# Patient Record
Sex: MALE | Race: WHITE | NOT HISPANIC OR LATINO | URBAN - METROPOLITAN AREA
[De-identification: names, ages, dates, MRNs, and addresses within clinical notes are randomized per-mention and may not be internally consistent; named-entity substitution may affect disease eponyms.]

---

## 2020-08-08 ENCOUNTER — EMERGENCY (EMERGENCY)
Facility: HOSPITAL | Age: 45
LOS: 0 days | Discharge: PSYCHIATRIC OR SUBSTANCE USE FACILITY | End: 2020-08-09
Attending: STUDENT IN AN ORGANIZED HEALTH CARE EDUCATION/TRAINING PROGRAM
Payer: MEDICAID

## 2020-08-08 VITALS
HEIGHT: 68 IN | WEIGHT: 184.97 LBS | SYSTOLIC BLOOD PRESSURE: 133 MMHG | DIASTOLIC BLOOD PRESSURE: 70 MMHG | TEMPERATURE: 99 F | RESPIRATION RATE: 16 BRPM | HEART RATE: 90 BPM | OXYGEN SATURATION: 98 %

## 2020-08-08 DIAGNOSIS — F32.9 MAJOR DEPRESSIVE DISORDER, SINGLE EPISODE, UNSPECIFIED: ICD-10-CM

## 2020-08-08 DIAGNOSIS — F90.9 ATTENTION-DEFICIT HYPERACTIVITY DISORDER, UNSPECIFIED TYPE: ICD-10-CM

## 2020-08-08 DIAGNOSIS — F10.20 ALCOHOL DEPENDENCE, UNCOMPLICATED: ICD-10-CM

## 2020-08-08 DIAGNOSIS — F14.99 COCAINE USE, UNSPECIFIED WITH UNSPECIFIED COCAINE-INDUCED DISORDER: ICD-10-CM

## 2020-08-08 DIAGNOSIS — F14.10 COCAINE ABUSE, UNCOMPLICATED: ICD-10-CM

## 2020-08-08 DIAGNOSIS — R45.851 SUICIDAL IDEATIONS: ICD-10-CM

## 2020-08-08 DIAGNOSIS — F17.210 NICOTINE DEPENDENCE, CIGARETTES, UNCOMPLICATED: ICD-10-CM

## 2020-08-08 LAB
ALBUMIN SERPL ELPH-MCNC: 3.4 G/DL — SIGNIFICANT CHANGE UP (ref 3.3–5)
ALP SERPL-CCNC: 56 U/L — SIGNIFICANT CHANGE UP (ref 40–120)
ALT FLD-CCNC: 33 U/L — SIGNIFICANT CHANGE UP (ref 12–78)
ANION GAP SERPL CALC-SCNC: 7 MMOL/L — SIGNIFICANT CHANGE UP (ref 5–17)
APAP SERPL-MCNC: <2 UG/ML — LOW (ref 10–30)
AST SERPL-CCNC: 17 U/L — SIGNIFICANT CHANGE UP (ref 15–37)
BASOPHILS # BLD AUTO: 0.05 K/UL — SIGNIFICANT CHANGE UP (ref 0–0.2)
BASOPHILS NFR BLD AUTO: 0.5 % — SIGNIFICANT CHANGE UP (ref 0–2)
BILIRUB SERPL-MCNC: 0.5 MG/DL — SIGNIFICANT CHANGE UP (ref 0.2–1.2)
BUN SERPL-MCNC: 11 MG/DL — SIGNIFICANT CHANGE UP (ref 7–23)
CALCIUM SERPL-MCNC: 8.5 MG/DL — SIGNIFICANT CHANGE UP (ref 8.5–10.1)
CHLORIDE SERPL-SCNC: 109 MMOL/L — HIGH (ref 96–108)
CO2 SERPL-SCNC: 26 MMOL/L — SIGNIFICANT CHANGE UP (ref 22–31)
CREAT SERPL-MCNC: 0.89 MG/DL — SIGNIFICANT CHANGE UP (ref 0.5–1.3)
EOSINOPHIL # BLD AUTO: 0.29 K/UL — SIGNIFICANT CHANGE UP (ref 0–0.5)
EOSINOPHIL NFR BLD AUTO: 2.7 % — SIGNIFICANT CHANGE UP (ref 0–6)
ETHANOL SERPL-MCNC: <10 MG/DL — SIGNIFICANT CHANGE UP (ref 0–10)
GLUCOSE SERPL-MCNC: 110 MG/DL — HIGH (ref 70–99)
HCT VFR BLD CALC: 46.1 % — SIGNIFICANT CHANGE UP (ref 39–50)
HGB BLD-MCNC: 15.9 G/DL — SIGNIFICANT CHANGE UP (ref 13–17)
IMM GRANULOCYTES NFR BLD AUTO: 0.2 % — SIGNIFICANT CHANGE UP (ref 0–1.5)
LYMPHOCYTES # BLD AUTO: 1.42 K/UL — SIGNIFICANT CHANGE UP (ref 1–3.3)
LYMPHOCYTES # BLD AUTO: 13.4 % — SIGNIFICANT CHANGE UP (ref 13–44)
MCHC RBC-ENTMCNC: 31.9 PG — SIGNIFICANT CHANGE UP (ref 27–34)
MCHC RBC-ENTMCNC: 34.5 GM/DL — SIGNIFICANT CHANGE UP (ref 32–36)
MCV RBC AUTO: 92.4 FL — SIGNIFICANT CHANGE UP (ref 80–100)
MONOCYTES # BLD AUTO: 0.6 K/UL — SIGNIFICANT CHANGE UP (ref 0–0.9)
MONOCYTES NFR BLD AUTO: 5.7 % — SIGNIFICANT CHANGE UP (ref 2–14)
NEUTROPHILS # BLD AUTO: 8.19 K/UL — HIGH (ref 1.8–7.4)
NEUTROPHILS NFR BLD AUTO: 77.5 % — HIGH (ref 43–77)
NRBC # BLD: 0 /100 WBCS — SIGNIFICANT CHANGE UP (ref 0–0)
PLATELET # BLD AUTO: 246 K/UL — SIGNIFICANT CHANGE UP (ref 150–400)
POTASSIUM SERPL-MCNC: 4.3 MMOL/L — SIGNIFICANT CHANGE UP (ref 3.5–5.3)
POTASSIUM SERPL-SCNC: 4.3 MMOL/L — SIGNIFICANT CHANGE UP (ref 3.5–5.3)
PROT SERPL-MCNC: 6.7 GM/DL — SIGNIFICANT CHANGE UP (ref 6–8.3)
RBC # BLD: 4.99 M/UL — SIGNIFICANT CHANGE UP (ref 4.2–5.8)
RBC # FLD: 12.3 % — SIGNIFICANT CHANGE UP (ref 10.3–14.5)
SALICYLATES SERPL-MCNC: 3.2 MG/DL — SIGNIFICANT CHANGE UP (ref 2.8–20)
SARS-COV-2 RNA SPEC QL NAA+PROBE: SIGNIFICANT CHANGE UP
SODIUM SERPL-SCNC: 142 MMOL/L — SIGNIFICANT CHANGE UP (ref 135–145)
WBC # BLD: 10.57 K/UL — HIGH (ref 3.8–10.5)
WBC # FLD AUTO: 10.57 K/UL — HIGH (ref 3.8–10.5)

## 2020-08-08 PROCEDURE — 93010 ELECTROCARDIOGRAM REPORT: CPT

## 2020-08-08 PROCEDURE — 90792 PSYCH DIAG EVAL W/MED SRVCS: CPT | Mod: 95

## 2020-08-08 PROCEDURE — 99285 EMERGENCY DEPT VISIT HI MDM: CPT

## 2020-08-08 RX ORDER — THIAMINE MONONITRATE (VIT B1) 100 MG
100 TABLET ORAL DAILY
Refills: 0 | Status: COMPLETED | OUTPATIENT
Start: 2020-08-09 | End: 2020-08-11

## 2020-08-08 RX ORDER — HALOPERIDOL DECANOATE 100 MG/ML
5 INJECTION INTRAMUSCULAR EVERY 6 HOURS
Refills: 0 | Status: DISCONTINUED | OUTPATIENT
Start: 2020-08-09 | End: 2020-08-24

## 2020-08-08 RX ORDER — FOLIC ACID 0.8 MG
1 TABLET ORAL DAILY
Refills: 0 | Status: DISCONTINUED | OUTPATIENT
Start: 2020-08-09 | End: 2020-08-26
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
## 2020-08-08 NOTE — ED BEHAVIORAL HEALTH ASSESSMENT NOTE - ACTIVATING EVENTS/STRESSORS
Triggering events leading to humiliation, shame, and/or despair (e.g. Loss of relationship, financial or health status) (real or anticipated)/Pending incarceration or homelessness/Current or pending social isolation/Perceived burden on family or others
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
## 2020-08-08 NOTE — ED BEHAVIORAL HEALTH ASSESSMENT NOTE - RISK ASSESSMENT
High Acute Suicide Risk Acute risk is elevated because of SI with plan. Other risks include substance abuse and homelessness. These are risks that are modifiable by inpatient treatment.
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
## 2020-08-08 NOTE — ED ADULT NURSE NOTE - NSIMPLEMENTINTERV_GEN_ALL_ED
Implemented All Universal Safety Interventions:  [place] to call system. Call bell, personal items and telephone within reach. Instruct patient to call for assistance. Room bathroom lighting operational. Non-slip footwear when patient is off stretcher. Physically safe environment: no spills, clutter or unnecessary equipment. Stretcher in lowest position, wheels locked, appropriate side rails in place.

## 2020-08-08 NOTE — ED ADULT NURSE NOTE - OBJECTIVE STATEMENT
45 year old male presents to ED stating he has suicidal thoughts with plan to overdose on drugs x appx one week.  Endorses occasional homicidal ideation of others. States he uses crack cocaine and drinks alcohol daily. Last use at 0800 today. Has hx of bipolar disorder, non-compliant on medication. Arrives a&ox3, in no acute distress. Pt currently calm, cooperative, speech and thoughts clear, coherent. 1:1 initiated.
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
## 2020-08-08 NOTE — ED ADULT TRIAGE NOTE - CHIEF COMPLAINT QUOTE
pt states he is depressed and feels suicidal . pt states he has been taking drugs for the last couple of days (crack and cocaine )

## 2020-08-08 NOTE — ED BEHAVIORAL HEALTH ASSESSMENT NOTE - SUMMARY
45 year-old man with depression and cocaine and alcohol use disorders, history of ADHD, denying psychiatric hospitalizations, no known suicide attempts, undomiciled, unemployed, single, who walked in to the ED today with a chief complaint of being suicidal. The patient reports persistently low mood and anhedonia in the context of psychosocial stressors such as homelessness and unemployment and social isolation. He reports SI with a plan to overdose on drugs. He presents as dysphoric and hopeless. The patient is appropriate for inpatient treatment.

## 2020-08-09 ENCOUNTER — INPATIENT (INPATIENT)
Facility: HOSPITAL | Age: 45
LOS: 16 days | Discharge: PSYCHIATRIC OR SUBSTANCE USE FACILITY | DRG: 753 | End: 2020-08-26
Attending: PSYCHIATRY & NEUROLOGY | Admitting: PSYCHIATRY & NEUROLOGY
Payer: MEDICAID

## 2020-08-09 VITALS
HEART RATE: 71 BPM | TEMPERATURE: 98 F | RESPIRATION RATE: 18 BRPM | SYSTOLIC BLOOD PRESSURE: 128 MMHG | OXYGEN SATURATION: 98 % | DIASTOLIC BLOOD PRESSURE: 84 MMHG

## 2020-08-09 VITALS — WEIGHT: 184.97 LBS | HEIGHT: 68 IN | RESPIRATION RATE: 16 BRPM | TEMPERATURE: 98 F

## 2020-08-09 DIAGNOSIS — F31.32 BIPOLAR DISORDER, CURRENT EPISODE DEPRESSED, MODERATE: ICD-10-CM

## 2020-08-09 DIAGNOSIS — F32.9 MAJOR DEPRESSIVE DISORDER, SINGLE EPISODE, UNSPECIFIED: ICD-10-CM

## 2020-08-09 DIAGNOSIS — F10.10 ALCOHOL ABUSE, UNCOMPLICATED: ICD-10-CM

## 2020-08-09 DIAGNOSIS — F14.10 COCAINE ABUSE, UNCOMPLICATED: ICD-10-CM

## 2020-08-09 LAB
ALBUMIN SERPL ELPH-MCNC: 3.2 G/DL — LOW (ref 3.3–5)
ALP SERPL-CCNC: 60 U/L — SIGNIFICANT CHANGE UP (ref 40–120)
ALT FLD-CCNC: 30 U/L — SIGNIFICANT CHANGE UP (ref 12–78)
AMPHET UR-MCNC: NEGATIVE — SIGNIFICANT CHANGE UP
ANION GAP SERPL CALC-SCNC: 4 MMOL/L — LOW (ref 5–17)
AST SERPL-CCNC: 17 U/L — SIGNIFICANT CHANGE UP (ref 15–37)
BARBITURATES UR SCN-MCNC: NEGATIVE — SIGNIFICANT CHANGE UP
BENZODIAZ UR-MCNC: NEGATIVE — SIGNIFICANT CHANGE UP
BILIRUB DIRECT SERPL-MCNC: <0.1 MG/DL — SIGNIFICANT CHANGE UP (ref 0–0.2)
BILIRUB INDIRECT FLD-MCNC: >0.1 MG/DL — LOW (ref 0.2–1)
BILIRUB SERPL-MCNC: 0.2 MG/DL — SIGNIFICANT CHANGE UP (ref 0.2–1.2)
BUN SERPL-MCNC: 12 MG/DL — SIGNIFICANT CHANGE UP (ref 7–23)
CALCIUM SERPL-MCNC: 8.3 MG/DL — LOW (ref 8.5–10.1)
CHLORIDE SERPL-SCNC: 109 MMOL/L — HIGH (ref 96–108)
CO2 SERPL-SCNC: 27 MMOL/L — SIGNIFICANT CHANGE UP (ref 22–31)
COCAINE METAB.OTHER UR-MCNC: POSITIVE — SIGNIFICANT CHANGE UP
CREAT SERPL-MCNC: 0.98 MG/DL — SIGNIFICANT CHANGE UP (ref 0.5–1.3)
GLUCOSE SERPL-MCNC: 96 MG/DL — SIGNIFICANT CHANGE UP (ref 70–99)
MAGNESIUM SERPL-MCNC: 2.1 MG/DL — SIGNIFICANT CHANGE UP (ref 1.6–2.6)
METHADONE UR-MCNC: NEGATIVE — SIGNIFICANT CHANGE UP
OPIATES UR-MCNC: NEGATIVE — SIGNIFICANT CHANGE UP
PCP SPEC-MCNC: SIGNIFICANT CHANGE UP
PCP UR-MCNC: NEGATIVE — SIGNIFICANT CHANGE UP
PHOSPHATE SERPL-MCNC: 3 MG/DL — SIGNIFICANT CHANGE UP (ref 2.5–4.5)
POTASSIUM SERPL-MCNC: 4 MMOL/L — SIGNIFICANT CHANGE UP (ref 3.5–5.3)
POTASSIUM SERPL-SCNC: 4 MMOL/L — SIGNIFICANT CHANGE UP (ref 3.5–5.3)
PROT SERPL-MCNC: 6.7 GM/DL — SIGNIFICANT CHANGE UP (ref 6–8.3)
SODIUM SERPL-SCNC: 140 MMOL/L — SIGNIFICANT CHANGE UP (ref 135–145)
THC UR QL: NEGATIVE — SIGNIFICANT CHANGE UP

## 2020-08-09 PROCEDURE — 99254 IP/OBS CNSLTJ NEW/EST MOD 60: CPT

## 2020-08-09 PROCEDURE — 93005 ELECTROCARDIOGRAM TRACING: CPT

## 2020-08-09 PROCEDURE — 80048 BASIC METABOLIC PNL TOTAL CA: CPT

## 2020-08-09 PROCEDURE — 36415 COLL VENOUS BLD VENIPUNCTURE: CPT

## 2020-08-09 PROCEDURE — 84443 ASSAY THYROID STIM HORMONE: CPT

## 2020-08-09 PROCEDURE — 80061 LIPID PANEL: CPT

## 2020-08-09 PROCEDURE — 80076 HEPATIC FUNCTION PANEL: CPT

## 2020-08-09 PROCEDURE — 80164 ASSAY DIPROPYLACETIC ACD TOT: CPT

## 2020-08-09 PROCEDURE — 83036 HEMOGLOBIN GLYCOSYLATED A1C: CPT

## 2020-08-09 PROCEDURE — 99222 1ST HOSP IP/OBS MODERATE 55: CPT

## 2020-08-09 PROCEDURE — 84100 ASSAY OF PHOSPHORUS: CPT

## 2020-08-09 PROCEDURE — 86769 SARS-COV-2 COVID-19 ANTIBODY: CPT

## 2020-08-09 PROCEDURE — 85027 COMPLETE CBC AUTOMATED: CPT

## 2020-08-09 PROCEDURE — 81003 URINALYSIS AUTO W/O SCOPE: CPT

## 2020-08-09 PROCEDURE — 93010 ELECTROCARDIOGRAM REPORT: CPT

## 2020-08-09 PROCEDURE — 83735 ASSAY OF MAGNESIUM: CPT

## 2020-08-09 RX ORDER — LAMOTRIGINE 25 MG/1
25 TABLET, ORALLY DISINTEGRATING ORAL ONCE
Refills: 0 | Status: COMPLETED | OUTPATIENT
Start: 2020-08-09 | End: 2020-08-09

## 2020-08-09 RX ORDER — LAMOTRIGINE 25 MG/1
25 TABLET, ORALLY DISINTEGRATING ORAL DAILY
Refills: 0 | Status: DISCONTINUED | OUTPATIENT
Start: 2020-08-10 | End: 2020-08-10

## 2020-08-09 RX ORDER — NICOTINE POLACRILEX 2 MG
1 GUM BUCCAL DAILY
Refills: 0 | Status: DISCONTINUED | OUTPATIENT
Start: 2020-08-09 | End: 2020-08-26

## 2020-08-09 RX ORDER — BUPROPION HYDROCHLORIDE 150 MG/1
75 TABLET, EXTENDED RELEASE ORAL DAILY
Refills: 0 | Status: DISCONTINUED | OUTPATIENT
Start: 2020-08-09 | End: 2020-08-14

## 2020-08-09 RX ORDER — NICOTINE POLACRILEX 2 MG
2 GUM BUCCAL
Refills: 0 | Status: DISCONTINUED | OUTPATIENT
Start: 2020-08-09 | End: 2020-08-26

## 2020-08-09 RX ADMIN — Medication 100 MILLIGRAM(S): at 09:28

## 2020-08-09 RX ADMIN — LAMOTRIGINE 25 MILLIGRAM(S): 25 TABLET, ORALLY DISINTEGRATING ORAL at 13:14

## 2020-08-09 RX ADMIN — Medication 1 MILLIGRAM(S): at 09:28

## 2020-08-09 RX ADMIN — BUPROPION HYDROCHLORIDE 75 MILLIGRAM(S): 150 TABLET, EXTENDED RELEASE ORAL at 13:14

## 2020-08-09 RX ADMIN — Medication 1 TABLET(S): at 09:28

## 2020-08-09 NOTE — BEHAVIORAL HEALTH ASSESSMENT NOTE - RISK ASSESSMENT
Moderate Acute Suicide Risk Acute risk is elevated because of SI with plan. Other risks include substance abuse and homelessness. These are risks that are modifiable by inpatient treatment.

## 2020-08-09 NOTE — BEHAVIORAL HEALTH ASSESSMENT NOTE - SUMMARY
45 year-old man with depression and cocaine and alcohol use disorders, history of ADHD, denying psychiatric hospitalizations, no known suicide attempts, un-domiciled, unemployed, single, who walked in to the ED today with a chief complaint of being suicidal. The patient reports persistently low mood and anhedonia in the context of psychosocial stressors such as homelessness and unemployment and social isolation. He reports SI with a plan to overdose on drugs. He presents as dysphoric and hopeless. The patient is appropriate for inpatient treatment.
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
## 2020-08-09 NOTE — BEHAVIORAL HEALTH ASSESSMENT NOTE - NSBHCHARTREVIEWLAB_PSY_A_CORE FT
15.9   10.57 )-----------( 246      ( 08 Aug 2020 16:05 )             46.1   08-09    140  |  109<H>  |  12  ----------------------------<  96  4.0   |  27  |  0.98    Ca    8.3<L>      09 Aug 2020 08:08  Phos  3.0     08-09  Mg     2.1     08-09    TPro  6.7  /  Alb  3.2<L>  /  TBili  0.2  /  DBili  <0.1  /  AST  17  /  ALT  30  /  AlkPhos  60  08-09

## 2020-08-09 NOTE — H&P ADULT - NSHPPHYSICALEXAM_GEN_ALL_CORE
Vital Signs Last 24 Hrs  T(C): 36.6 (09 Aug 2020 07:52), Max: 36.9 (09 Aug 2020 02:07)  T(F): 97.9 (09 Aug 2020 07:52), Max: 98.4 (09 Aug 2020 02:07)  HR: 71 (09 Aug 2020 02:07) (71 - 77)  BP: 128/84 (09 Aug 2020 02:07) (128/84 - 129/81)  RR: 16 (09 Aug 2020 07:52) (16 - 18)  SpO2: 98% (09 Aug 2020 07:52) (98% - 98%)

## 2020-08-09 NOTE — BEHAVIORAL HEALTH ASSESSMENT NOTE - ACTIVATING EVENTS/STRESSORS
Current or pending social isolation/Perceived burden on family or others/Pending incarceration or homelessness/Triggering events leading to humiliation, shame, and/or despair (e.g. Loss of relationship, financial or health status) (real or anticipated)

## 2020-08-09 NOTE — H&P ADULT - ASSESSMENT
46 yo M with PMH of ADHD, Bipolar admitted to [facility] for eval. of worsening of depression, suicidal ideation and auditory hallucination.    # Depression/Suicidal ideation/ ADHD/ Bipolar disorder/ Psych problems  - Manage as per primary psych team    # Mild Leucocytosis  - likely reactive  - WBC: 10.5, afebrile  - Denies fever, chills, cough, running nose, dysuria diarrhea  - UA ordered   - Repeat CBC  - Encourage oral hydration      # Hx of Cocaine use  - Denies chest pain, SOB, palpitation or any acute c/o at present  - EKG: NSR 74, Qt/Qtc: 398/441 ms  - Counselled on cessation, education provided    # DVT Ppx  - Encourage Ambulation    IMPROVE VTE Individual Risk Assessment    RISK                                                                Points    [  ] Previous VTE                                                  3    [  ] Thrombophilia                                               2    [  ] Lower limb paralysis                                      2        (unable to hold up >15 seconds)      [  ] Current Cancer                                              2         (within 6 months)    [  ] Immobilization > 24 hrs                                1    [  ] ICU/CCU stay > 24 hours                              1    [  ] Age > 60                                                      1    IMPROVE VTE Score ______0___    IMPROVE Score 0-1: Low Risk, No VTE prophylaxis required for most patients, encourage ambulation.   IMPROVE Score 2-3: At risk, pharmacologic VTE prophylaxis is indicated for most patients (in the absence of a contraindication)  IMPROVE Score > or = 4: High Risk, pharmacologic VTE prophylaxis is indicated for most patients (in the absence of a contraindication)      Case d/w  46 yo M with PMH of ADHD, Bipolar admitted to [facility] for eval. of worsening of depression, suicidal ideation and auditory hallucination.    # Depression/Suicidal ideation/ ADHD/ Bipolar disorder/ Psych problems  - Manage as per primary psych team    # Mild Leucocytosis  - likely reactive  - WBC: 10.5, afebrile  - Denies fever, chills, cough, runny nose, dysuria diarrhea  - UA ordered   - Repeat CBC  - Encourage oral hydration      # Hx of Cocaine use  - Denies chest pain, SOB, palpitation or any acute c/o at present  - EKG: NSR 74, Qt/Qtc: 398/441 ms  - Counselled on cessation, education provided    # Nicotine dependence  - Nicotine patch   - Counselled on cessation, education provided    # Hx of Alcohol abuse  - CIWA protocol ordered by primary team   - c/w MVI/THiamine/FA  - Monitor for withdrawl    # DVT Ppx  - Encourage Ambulation    IMPROVE VTE Individual Risk Assessment    RISK                                                                Points    [  ] Previous VTE                                                  3    [  ] Thrombophilia                                               2    [  ] Lower limb paralysis                                      2        (unable to hold up >15 seconds)      [  ] Current Cancer                                              2         (within 6 months)    [  ] Immobilization > 24 hrs                                1    [  ] ICU/CCU stay > 24 hours                              1    [  ] Age > 60                                                      1    IMPROVE VTE Score ______0___    IMPROVE Score 0-1: Low Risk, No VTE prophylaxis required for most patients, encourage ambulation.   IMPROVE Score 2-3: At risk, pharmacologic VTE prophylaxis is indicated for most patients (in the absence of a contraindication)  IMPROVE Score > or = 4: High Risk, pharmacologic VTE prophylaxis is indicated for most patients (in the absence of a contraindication)      Case d/w

## 2020-08-09 NOTE — BEHAVIORAL HEALTH ASSESSMENT NOTE - NSBHCHARTREVIEWVS_PSY_A_CORE FT
Vital Signs Last 24 Hrs  T(C): 36.6 (09 Aug 2020 07:52), Max: 37.1 (08 Aug 2020 14:47)  T(F): 97.9 (09 Aug 2020 07:52), Max: 98.7 (08 Aug 2020 14:47)  HR: 71 (09 Aug 2020 02:07) (71 - 90)  BP: 128/84 (09 Aug 2020 02:07) (128/84 - 133/70)  BP(mean): --  RR: 16 (09 Aug 2020 07:52) (16 - 18)  SpO2: 98% (09 Aug 2020 07:52) (97% - 98%)

## 2020-08-09 NOTE — H&P ADULT - HISTORY OF PRESENT ILLNESS
46 yo M with PMH of ADHD, Bipolar admitted to  for eval. of worsening of depression, suicidal ideation and auditory hallucination. Pt is resting in bed. Denies headache, dizziness, chest pain, palpitation or SOB. Denies fever, chills, cough, dysuria or diarrhea. Medicine consult is called for medical Mx. Pt was seen and examined in presence of   Nursing Assistant.     PMH: As above  PSH: Denies any surgery in the past  Social Hx: Pt reported that he smokes 2PPD, Smokes cocaine daily, Drinks alcohol 2- 3 pint of vodka  Family Hx: Denies any family Hx of CAD, DM, HTN or HLD
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
## 2020-08-10 LAB
ANION GAP SERPL CALC-SCNC: 7 MMOL/L — SIGNIFICANT CHANGE UP (ref 5–17)
BUN SERPL-MCNC: 12 MG/DL — SIGNIFICANT CHANGE UP (ref 7–23)
CALCIUM SERPL-MCNC: 8.6 MG/DL — SIGNIFICANT CHANGE UP (ref 8.5–10.1)
CHLORIDE SERPL-SCNC: 109 MMOL/L — HIGH (ref 96–108)
CO2 SERPL-SCNC: 24 MMOL/L — SIGNIFICANT CHANGE UP (ref 22–31)
CREAT SERPL-MCNC: 0.88 MG/DL — SIGNIFICANT CHANGE UP (ref 0.5–1.3)
GLUCOSE SERPL-MCNC: 95 MG/DL — SIGNIFICANT CHANGE UP (ref 70–99)
HCT VFR BLD CALC: 45.5 % — SIGNIFICANT CHANGE UP (ref 39–50)
HGB BLD-MCNC: 15.3 G/DL — SIGNIFICANT CHANGE UP (ref 13–17)
MAGNESIUM SERPL-MCNC: 2.2 MG/DL — SIGNIFICANT CHANGE UP (ref 1.6–2.6)
MCHC RBC-ENTMCNC: 32.2 PG — SIGNIFICANT CHANGE UP (ref 27–34)
MCHC RBC-ENTMCNC: 33.6 GM/DL — SIGNIFICANT CHANGE UP (ref 32–36)
MCV RBC AUTO: 95.8 FL — SIGNIFICANT CHANGE UP (ref 80–100)
PHOSPHATE SERPL-MCNC: 3.3 MG/DL — SIGNIFICANT CHANGE UP (ref 2.5–4.5)
PLATELET # BLD AUTO: 212 K/UL — SIGNIFICANT CHANGE UP (ref 150–400)
POTASSIUM SERPL-MCNC: 4.1 MMOL/L — SIGNIFICANT CHANGE UP (ref 3.5–5.3)
POTASSIUM SERPL-SCNC: 4.1 MMOL/L — SIGNIFICANT CHANGE UP (ref 3.5–5.3)
RBC # BLD: 4.75 M/UL — SIGNIFICANT CHANGE UP (ref 4.2–5.8)
RBC # FLD: 12.1 % — SIGNIFICANT CHANGE UP (ref 10.3–14.5)
SODIUM SERPL-SCNC: 140 MMOL/L — SIGNIFICANT CHANGE UP (ref 135–145)
WBC # BLD: 7.49 K/UL — SIGNIFICANT CHANGE UP (ref 3.8–10.5)
WBC # FLD AUTO: 7.49 K/UL — SIGNIFICANT CHANGE UP (ref 3.8–10.5)

## 2020-08-10 PROCEDURE — 99232 SBSQ HOSP IP/OBS MODERATE 35: CPT

## 2020-08-10 RX ORDER — CYCLOBENZAPRINE HYDROCHLORIDE 10 MG/1
5 TABLET, FILM COATED ORAL THREE TIMES A DAY
Refills: 0 | Status: DISCONTINUED | OUTPATIENT
Start: 2020-08-10 | End: 2020-08-11

## 2020-08-10 RX ORDER — IBUPROFEN 200 MG
400 TABLET ORAL EVERY 6 HOURS
Refills: 0 | Status: DISCONTINUED | OUTPATIENT
Start: 2020-08-10 | End: 2020-08-17

## 2020-08-10 RX ORDER — DIVALPROEX SODIUM 500 MG/1
250 TABLET, DELAYED RELEASE ORAL DAILY
Refills: 0 | Status: DISCONTINUED | OUTPATIENT
Start: 2020-08-11 | End: 2020-08-26

## 2020-08-10 RX ORDER — IBUPROFEN 200 MG
400 TABLET ORAL EVERY 6 HOURS
Refills: 0 | Status: DISCONTINUED | OUTPATIENT
Start: 2020-08-10 | End: 2020-08-10

## 2020-08-10 RX ORDER — DIVALPROEX SODIUM 500 MG/1
500 TABLET, DELAYED RELEASE ORAL AT BEDTIME
Refills: 0 | Status: DISCONTINUED | OUTPATIENT
Start: 2020-08-10 | End: 2020-08-14

## 2020-08-10 RX ADMIN — Medication 1 TABLET(S): at 09:07

## 2020-08-10 RX ADMIN — BUPROPION HYDROCHLORIDE 75 MILLIGRAM(S): 150 TABLET, EXTENDED RELEASE ORAL at 09:07

## 2020-08-10 RX ADMIN — Medication 1 PATCH: at 09:07

## 2020-08-10 RX ADMIN — LAMOTRIGINE 25 MILLIGRAM(S): 25 TABLET, ORALLY DISINTEGRATING ORAL at 09:07

## 2020-08-10 RX ADMIN — Medication 1 MILLIGRAM(S): at 09:07

## 2020-08-10 RX ADMIN — Medication 100 MILLIGRAM(S): at 09:07

## 2020-08-10 NOTE — PROGRESS NOTE BEHAVIORAL HEALTH - NSBHCHARTREVIEWVS_PSY_A_CORE FT
Vital Signs Last 24 Hrs  T(C): 36.4 (10 Aug 2020 07:46), Max: 36.4 (10 Aug 2020 07:46)  T(F): 97.6 (10 Aug 2020 07:46), Max: 97.6 (10 Aug 2020 07:46)  HR: 80 (10 Aug 2020 06:25) (80 - 80)  BP: 130/92 (10 Aug 2020 06:25) (130/92 - 130/92)  BP(mean): 102 (10 Aug 2020 06:25) (102 - 102)  RR: 18 (10 Aug 2020 07:46) (18 - 18)  SpO2: 100% (10 Aug 2020 07:46) (100% - 100%)

## 2020-08-10 NOTE — PROGRESS NOTE BEHAVIORAL HEALTH - NSBHFUPIPCHARTREVFT_PSY_A_CORE
45 yr old Single  Unemployed homeless domiciled in a van, hx of Bipolar affective disorder, currently depressed, cocaine and alcohol use disorders, history of ADHD, self referred to ED at [facility], claims to be very depressed for the past several weeks and using cocaine and alcohol to self medicate. He emphasized feeling so hopelessness, anhedonia that he  considered suicide by overdosing on drugs.

## 2020-08-10 NOTE — PROGRESS NOTE BEHAVIORAL HEALTH - PROBLEM SELECTOR PLAN 2
encourage to attend groups for insight and coping skill m encourage to attend groups for insight and coping skill

## 2020-08-10 NOTE — PROGRESS NOTE BEHAVIORAL HEALTH - RISK ASSESSMENT
moderate risk  Acute: suicidal ideation , impulsive,  protective: supportive family, pt with help seeking behavior   Chronic: homeless. cocaine. alcohol moderate risk  Acute: suicidal ideation , impulsive,  mitigating factors: Pt denies any suicidal intent or plan , pt hoping to attend rehab treatment   protective: supportive family, pt with help seeking behavior   Chronic: homeless. cocaine. alcohol
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
## 2020-08-10 NOTE — PROGRESS NOTE BEHAVIORAL HEALTH - NSBHADDHXSUBSTFT_PSY_A_CORE
using 6 grams of cocaine per day along with 6-8 tall boys and 2-3 pints of vodka per day for about the past 4-6 months.  rehab mostly [facility] in 2010 and was clean for years, but relapsed

## 2020-08-10 NOTE — PROGRESS NOTE BEHAVIORAL HEALTH - SUMMARY
45 year-old man with depression and cocaine and alcohol use disorders, history of ADHD, denying psychiatric hospitalizations, no known suicide attempts, undomiciled, unemployed, single, who walked in to the ED today with a chief complaint of being suicidal. The patient reports persistently low mood and anhedonia in the context of psychosocial stressors such as homelessness and unemployment and social isolation. He reports SI with a plan to overdose on drugs. He presents as dysphoric and hopeless. The patient is appropriate for inpatient treatment. 45 year-old man with depression and cocaine and alcohol use disorders, history of ADHD, denying psychiatric hospitalizations, no known suicide attempts, undomiciled, unemployed, single, who walked in to the ED today with a chief complaint of being suicidal. The patient reports persistently low mood and anhedonia in the context of psychosocial stressors such as homelessness and unemployment and social isolation. He reports SI with a plan to overdose on drugs. He presents as dysphoric and hopeless. The patient is appropriate for inpatient treatment.    Hospitalization  course:   8/10/2020 Pt  with willingness to try Depakote for mood stability , still depressed in mood and wanting to remain on the antidepressant medication .

## 2020-08-11 LAB
A1C WITH ESTIMATED AVERAGE GLUCOSE RESULT: 5.1 % — SIGNIFICANT CHANGE UP (ref 4–5.6)
APPEARANCE UR: CLEAR — SIGNIFICANT CHANGE UP
BILIRUB UR-MCNC: NEGATIVE — SIGNIFICANT CHANGE UP
CHOLEST SERPL-MCNC: 184 MG/DL — SIGNIFICANT CHANGE UP (ref 10–199)
COLOR SPEC: YELLOW — SIGNIFICANT CHANGE UP
DIFF PNL FLD: NEGATIVE — SIGNIFICANT CHANGE UP
ESTIMATED AVERAGE GLUCOSE: 100 MG/DL — SIGNIFICANT CHANGE UP (ref 68–114)
GLUCOSE UR QL: NEGATIVE MG/DL — SIGNIFICANT CHANGE UP
HDLC SERPL-MCNC: 37 MG/DL — LOW
KETONES UR-MCNC: NEGATIVE — SIGNIFICANT CHANGE UP
LEUKOCYTE ESTERASE UR-ACNC: NEGATIVE — SIGNIFICANT CHANGE UP
LIPID PNL WITH DIRECT LDL SERPL: 110 MG/DL — HIGH
NITRITE UR-MCNC: NEGATIVE — SIGNIFICANT CHANGE UP
PH UR: 6 — SIGNIFICANT CHANGE UP (ref 5–8)
PROT UR-MCNC: NEGATIVE MG/DL — SIGNIFICANT CHANGE UP
SP GR SPEC: 1.02 — SIGNIFICANT CHANGE UP (ref 1.01–1.02)
TOTAL CHOLESTEROL/HDL RATIO MEASUREMENT: 5 RATIO — SIGNIFICANT CHANGE UP (ref 3.4–9.6)
TRIGL SERPL-MCNC: 184 MG/DL — HIGH (ref 10–149)
TSH SERPL-MCNC: 0.99 UU/ML — SIGNIFICANT CHANGE UP (ref 0.34–4.82)
UROBILINOGEN FLD QL: NEGATIVE MG/DL — SIGNIFICANT CHANGE UP

## 2020-08-11 PROCEDURE — 99232 SBSQ HOSP IP/OBS MODERATE 35: CPT

## 2020-08-11 RX ORDER — CYCLOBENZAPRINE HYDROCHLORIDE 10 MG/1
5 TABLET, FILM COATED ORAL AT BEDTIME
Refills: 0 | Status: DISCONTINUED | OUTPATIENT
Start: 2020-08-11 | End: 2020-08-26

## 2020-08-11 RX ADMIN — Medication 400 MILLIGRAM(S): at 10:09

## 2020-08-11 RX ADMIN — Medication 1 PATCH: at 10:07

## 2020-08-11 RX ADMIN — Medication 1 PATCH: at 10:11

## 2020-08-11 RX ADMIN — Medication 400 MILLIGRAM(S): at 19:07

## 2020-08-11 RX ADMIN — Medication 400 MILLIGRAM(S): at 11:21

## 2020-08-11 RX ADMIN — Medication 400 MILLIGRAM(S): at 20:12

## 2020-08-11 RX ADMIN — Medication 1 MILLIGRAM(S): at 10:07

## 2020-08-11 RX ADMIN — BUPROPION HYDROCHLORIDE 75 MILLIGRAM(S): 150 TABLET, EXTENDED RELEASE ORAL at 10:08

## 2020-08-11 RX ADMIN — Medication 1 PATCH: at 19:13

## 2020-08-11 RX ADMIN — DIVALPROEX SODIUM 250 MILLIGRAM(S): 500 TABLET, DELAYED RELEASE ORAL at 10:07

## 2020-08-11 RX ADMIN — Medication 100 MILLIGRAM(S): at 10:07

## 2020-08-11 RX ADMIN — Medication 1 TABLET(S): at 10:07

## 2020-08-11 RX ADMIN — DIVALPROEX SODIUM 500 MILLIGRAM(S): 500 TABLET, DELAYED RELEASE ORAL at 21:15

## 2020-08-11 RX ADMIN — CYCLOBENZAPRINE HYDROCHLORIDE 5 MILLIGRAM(S): 10 TABLET, FILM COATED ORAL at 22:02

## 2020-08-11 NOTE — PROGRESS NOTE BEHAVIORAL HEALTH - NSBHFUPINTERVALHXFT_PSY_A_CORE
Pt with indicated that he is able to sleep better . Still with some mood and affect lability but decreasing in intensity.  Pt with valproic acid level on the 8/14. Pt with denial of any side effects . no daytime sedation.   Pt considering rehab treatment but CSW is needing to check if [facility] is offering this

## 2020-08-11 NOTE — PROGRESS NOTE BEHAVIORAL HEALTH - NSBHCHARTREVIEWVS_PSY_A_CORE FT
Vital Signs Last 24 Hrs  T(C): 36.9 (11 Aug 2020 07:46), Max: 36.9 (11 Aug 2020 07:46)  T(F): 98.4 (11 Aug 2020 07:46), Max: 98.4 (11 Aug 2020 07:46)  HR: 88 (11 Aug 2020 06:17) (88 - 88)  BP: 123/75 (11 Aug 2020 06:17) (123/75 - 123/75)  BP(mean): --  RR: 12 (11 Aug 2020 07:46) (12 - 12)  SpO2: 100% (11 Aug 2020 07:46) (100% - 100%)

## 2020-08-11 NOTE — PROGRESS NOTE BEHAVIORAL HEALTH - RISK ASSESSMENT
low risk  Acute: decreased suicidal ideation , decreased impulsivity, improving sleep  mitigating factors: Pt denies any suicidal intent or plan , pt hoping to attend rehab treatment   protective: supportive family, pt with help seeking behavior   Chronic: homeless. cocaine. alcohol

## 2020-08-12 PROCEDURE — 99232 SBSQ HOSP IP/OBS MODERATE 35: CPT

## 2020-08-12 RX ADMIN — DIVALPROEX SODIUM 250 MILLIGRAM(S): 500 TABLET, DELAYED RELEASE ORAL at 10:33

## 2020-08-12 RX ADMIN — CYCLOBENZAPRINE HYDROCHLORIDE 5 MILLIGRAM(S): 10 TABLET, FILM COATED ORAL at 22:04

## 2020-08-12 RX ADMIN — Medication 1 PATCH: at 10:36

## 2020-08-12 RX ADMIN — Medication 1 TABLET(S): at 10:33

## 2020-08-12 RX ADMIN — DIVALPROEX SODIUM 500 MILLIGRAM(S): 500 TABLET, DELAYED RELEASE ORAL at 22:04

## 2020-08-12 RX ADMIN — Medication 1 PATCH: at 07:01

## 2020-08-12 RX ADMIN — Medication 400 MILLIGRAM(S): at 16:48

## 2020-08-12 RX ADMIN — BUPROPION HYDROCHLORIDE 75 MILLIGRAM(S): 150 TABLET, EXTENDED RELEASE ORAL at 10:33

## 2020-08-12 RX ADMIN — Medication 1 MILLIGRAM(S): at 10:33

## 2020-08-12 RX ADMIN — Medication 400 MILLIGRAM(S): at 10:34

## 2020-08-12 RX ADMIN — Medication 400 MILLIGRAM(S): at 12:02

## 2020-08-12 RX ADMIN — Medication 1 PATCH: at 10:33

## 2020-08-12 NOTE — PROGRESS NOTE BEHAVIORAL HEALTH - NSBHFUPINTERVALHXFT_PSY_A_CORE
Pt claims that he is able to get sleep and with decreased of thought flooding. Pt aware of the need for valproic acid level .

## 2020-08-12 NOTE — PROGRESS NOTE BEHAVIORAL HEALTH - NSBHCHARTREVIEWVS_PSY_A_CORE FT
Vital Signs Last 24 Hrs  T(C): 36.7 (12 Aug 2020 08:07), Max: 36.7 (12 Aug 2020 08:07)  T(F): 98.1 (12 Aug 2020 08:07), Max: 98.1 (12 Aug 2020 08:07)  HR: --  BP: --  BP(mean): --  RR: 18 (12 Aug 2020 08:07) (18 - 18)  SpO2: 100% (12 Aug 2020 08:07) (100% - 100%)

## 2020-08-13 PROCEDURE — 99232 SBSQ HOSP IP/OBS MODERATE 35: CPT

## 2020-08-13 RX ADMIN — Medication 1 PATCH: at 07:22

## 2020-08-13 RX ADMIN — Medication 1 PATCH: at 09:35

## 2020-08-13 RX ADMIN — BUPROPION HYDROCHLORIDE 75 MILLIGRAM(S): 150 TABLET, EXTENDED RELEASE ORAL at 09:35

## 2020-08-13 RX ADMIN — DIVALPROEX SODIUM 250 MILLIGRAM(S): 500 TABLET, DELAYED RELEASE ORAL at 09:35

## 2020-08-13 RX ADMIN — CYCLOBENZAPRINE HYDROCHLORIDE 5 MILLIGRAM(S): 10 TABLET, FILM COATED ORAL at 22:16

## 2020-08-13 RX ADMIN — DIVALPROEX SODIUM 500 MILLIGRAM(S): 500 TABLET, DELAYED RELEASE ORAL at 22:16

## 2020-08-13 RX ADMIN — Medication 400 MILLIGRAM(S): at 19:07

## 2020-08-13 RX ADMIN — Medication 1 TABLET(S): at 09:35

## 2020-08-13 RX ADMIN — Medication 1 PATCH: at 15:22

## 2020-08-13 RX ADMIN — Medication 1 MILLIGRAM(S): at 09:35

## 2020-08-13 NOTE — PROGRESS NOTE BEHAVIORAL HEALTH - NSBHCHARTREVIEWVS_PSY_A_CORE FT
Vital Signs Last 24 Hrs  T(C): 36.6 (13 Aug 2020 08:12), Max: 36.8 (12 Aug 2020 20:23)  T(F): 97.8 (13 Aug 2020 08:12), Max: 98.2 (12 Aug 2020 20:23)  HR: 79 (13 Aug 2020 12:15) (71 - 79)  BP: 130/68 (13 Aug 2020 12:15) (117/66 - 130/68)  BP(mean): --  RR: 18 (13 Aug 2020 08:12) (16 - 18)  SpO2: 100% (13 Aug 2020 08:12) (100% - 100%)

## 2020-08-14 LAB — VALPROATE SERPL-MCNC: 51 UG/ML — SIGNIFICANT CHANGE UP (ref 50–100)

## 2020-08-14 PROCEDURE — 99232 SBSQ HOSP IP/OBS MODERATE 35: CPT

## 2020-08-14 RX ORDER — BUPROPION HYDROCHLORIDE 150 MG/1
150 TABLET, EXTENDED RELEASE ORAL DAILY
Refills: 0 | Status: DISCONTINUED | OUTPATIENT
Start: 2020-08-15 | End: 2020-08-26

## 2020-08-14 RX ORDER — DIVALPROEX SODIUM 500 MG/1
750 TABLET, DELAYED RELEASE ORAL AT BEDTIME
Refills: 0 | Status: DISCONTINUED | OUTPATIENT
Start: 2020-08-14 | End: 2020-08-26

## 2020-08-14 RX ADMIN — Medication 400 MILLIGRAM(S): at 09:46

## 2020-08-14 RX ADMIN — Medication 1 PATCH: at 09:38

## 2020-08-14 RX ADMIN — Medication 400 MILLIGRAM(S): at 21:05

## 2020-08-14 RX ADMIN — DIVALPROEX SODIUM 250 MILLIGRAM(S): 500 TABLET, DELAYED RELEASE ORAL at 09:37

## 2020-08-14 RX ADMIN — Medication 400 MILLIGRAM(S): at 20:05

## 2020-08-14 RX ADMIN — Medication 400 MILLIGRAM(S): at 10:46

## 2020-08-14 RX ADMIN — Medication 1 MILLIGRAM(S): at 09:37

## 2020-08-14 RX ADMIN — CYCLOBENZAPRINE HYDROCHLORIDE 5 MILLIGRAM(S): 10 TABLET, FILM COATED ORAL at 22:35

## 2020-08-14 RX ADMIN — Medication 1 TABLET(S): at 09:37

## 2020-08-14 RX ADMIN — DIVALPROEX SODIUM 750 MILLIGRAM(S): 500 TABLET, DELAYED RELEASE ORAL at 21:11

## 2020-08-14 RX ADMIN — Medication 1 PATCH: at 19:15

## 2020-08-14 RX ADMIN — BUPROPION HYDROCHLORIDE 75 MILLIGRAM(S): 150 TABLET, EXTENDED RELEASE ORAL at 09:37

## 2020-08-14 NOTE — PROGRESS NOTE BEHAVIORAL HEALTH - NSBHCHARTREVIEWVS_PSY_A_CORE FT
Vital Signs Last 24 Hrs  T(C): 36.6 (14 Aug 2020 07:58), Max: 36.6 (13 Aug 2020 20:22)  T(F): 97.9 (14 Aug 2020 07:58), Max: 97.9 (14 Aug 2020 07:58)  HR: 72 (14 Aug 2020 05:51) (72 - 74)  BP: 110/68 (14 Aug 2020 05:51) (110/68 - 126/78)  BP(mean): 77 (14 Aug 2020 05:51) (77 - 77)  RR: 14 (14 Aug 2020 07:58) (14 - 18)  SpO2: 100% (14 Aug 2020 07:58) (100% - 100%)

## 2020-08-14 NOTE — PROGRESS NOTE BEHAVIORAL HEALTH - RISK ASSESSMENT
low risk  Acute: denies any  suicidal ideation , decreased impulsivity, improving sleep  mitigating factors: Pt denies any suicidal intent or plan , pt hoping to attend rehab treatment   protective: supportive family, pt with help seeking behavior   Chronic: homeless. cocaine. alcohol

## 2020-08-14 NOTE — PROGRESS NOTE BEHAVIORAL HEALTH - SUMMARY
45 year-old man with depression and cocaine and alcohol use disorders, history of ADHD, denying psychiatric hospitalizations, no known suicide attempts, undomiciled, unemployed, single, who walked in to the ED today with a chief complaint of being suicidal. The patient reports persistently low mood and anhedonia in the context of psychosocial stressors such as homelessness and unemployment and social isolation. He reports SI with a plan to overdose on drugs. He presents as dysphoric and hopeless. The patient is appropriate for inpatient treatment.    Hospitalization:  8/14/2020 Pt with improved sleep Pt with decreased anxiety and denies any suicidal ideation intent or plan

## 2020-08-14 NOTE — PROGRESS NOTE BEHAVIORAL HEALTH - NSBHFUPINTERVALHXFT_PSY_A_CORE
Pt with valproic acid level of 51 , will adjust the depakote 250mg am and 750mg po hs  dose.   Will be able to increase the wellbutrin  to 150mg as the pt still with some depression. He denies any suicidal ideation intent or plan

## 2020-08-15 RX ADMIN — Medication 400 MILLIGRAM(S): at 10:15

## 2020-08-15 RX ADMIN — DIVALPROEX SODIUM 750 MILLIGRAM(S): 500 TABLET, DELAYED RELEASE ORAL at 20:51

## 2020-08-15 RX ADMIN — Medication 400 MILLIGRAM(S): at 09:16

## 2020-08-15 RX ADMIN — Medication 1 PATCH: at 09:13

## 2020-08-15 RX ADMIN — Medication 400 MILLIGRAM(S): at 23:20

## 2020-08-15 RX ADMIN — Medication 1 PATCH: at 19:05

## 2020-08-15 RX ADMIN — DIVALPROEX SODIUM 250 MILLIGRAM(S): 500 TABLET, DELAYED RELEASE ORAL at 09:13

## 2020-08-15 RX ADMIN — Medication 400 MILLIGRAM(S): at 22:50

## 2020-08-15 RX ADMIN — BUPROPION HYDROCHLORIDE 150 MILLIGRAM(S): 150 TABLET, EXTENDED RELEASE ORAL at 09:13

## 2020-08-15 RX ADMIN — Medication 1 MILLIGRAM(S): at 09:13

## 2020-08-15 RX ADMIN — CYCLOBENZAPRINE HYDROCHLORIDE 5 MILLIGRAM(S): 10 TABLET, FILM COATED ORAL at 22:51

## 2020-08-15 RX ADMIN — Medication 1 TABLET(S): at 09:13

## 2020-08-16 RX ADMIN — Medication 1 TABLET(S): at 09:13

## 2020-08-16 RX ADMIN — DIVALPROEX SODIUM 250 MILLIGRAM(S): 500 TABLET, DELAYED RELEASE ORAL at 09:13

## 2020-08-16 RX ADMIN — Medication 1 PATCH: at 09:13

## 2020-08-16 RX ADMIN — Medication 1 PATCH: at 18:32

## 2020-08-16 RX ADMIN — Medication 400 MILLIGRAM(S): at 14:44

## 2020-08-16 RX ADMIN — BUPROPION HYDROCHLORIDE 150 MILLIGRAM(S): 150 TABLET, EXTENDED RELEASE ORAL at 09:13

## 2020-08-16 RX ADMIN — Medication 1 MILLIGRAM(S): at 09:13

## 2020-08-16 RX ADMIN — CYCLOBENZAPRINE HYDROCHLORIDE 5 MILLIGRAM(S): 10 TABLET, FILM COATED ORAL at 22:13

## 2020-08-16 RX ADMIN — Medication 400 MILLIGRAM(S): at 15:32

## 2020-08-16 RX ADMIN — DIVALPROEX SODIUM 750 MILLIGRAM(S): 500 TABLET, DELAYED RELEASE ORAL at 22:13

## 2020-08-16 RX ADMIN — Medication 400 MILLIGRAM(S): at 22:13

## 2020-08-17 LAB — VALPROATE SERPL-MCNC: 59 UG/ML — SIGNIFICANT CHANGE UP (ref 50–100)

## 2020-08-17 PROCEDURE — 99232 SBSQ HOSP IP/OBS MODERATE 35: CPT

## 2020-08-17 RX ORDER — IBUPROFEN 200 MG
600 TABLET ORAL THREE TIMES A DAY
Refills: 0 | Status: DISCONTINUED | OUTPATIENT
Start: 2020-08-17 | End: 2020-08-26

## 2020-08-17 RX ADMIN — Medication 600 MILLIGRAM(S): at 20:28

## 2020-08-17 RX ADMIN — Medication 1 PATCH: at 08:00

## 2020-08-17 RX ADMIN — BUPROPION HYDROCHLORIDE 150 MILLIGRAM(S): 150 TABLET, EXTENDED RELEASE ORAL at 09:59

## 2020-08-17 RX ADMIN — Medication 1 MILLIGRAM(S): at 09:59

## 2020-08-17 RX ADMIN — CYCLOBENZAPRINE HYDROCHLORIDE 5 MILLIGRAM(S): 10 TABLET, FILM COATED ORAL at 22:33

## 2020-08-17 RX ADMIN — Medication 1 TABLET(S): at 09:59

## 2020-08-17 RX ADMIN — Medication 1 PATCH: at 09:15

## 2020-08-17 RX ADMIN — DIVALPROEX SODIUM 250 MILLIGRAM(S): 500 TABLET, DELAYED RELEASE ORAL at 09:59

## 2020-08-17 RX ADMIN — Medication 1 PATCH: at 09:59

## 2020-08-17 RX ADMIN — Medication 400 MILLIGRAM(S): at 13:09

## 2020-08-17 RX ADMIN — Medication 400 MILLIGRAM(S): at 00:20

## 2020-08-17 RX ADMIN — DIVALPROEX SODIUM 750 MILLIGRAM(S): 500 TABLET, DELAYED RELEASE ORAL at 20:28

## 2020-08-17 RX ADMIN — Medication 400 MILLIGRAM(S): at 13:10

## 2020-08-17 NOTE — PROGRESS NOTE BEHAVIORAL HEALTH - NSBHCHARTREVIEWVS_PSY_A_CORE FT
Vital Signs Last 24 Hrs  T(C): 36.8 (17 Aug 2020 07:39), Max: 36.8 (17 Aug 2020 07:39)  T(F): 98.3 (17 Aug 2020 07:39), Max: 98.3 (17 Aug 2020 07:39)  HR: --  BP: --  BP(mean): --  RR: 18 (17 Aug 2020 07:39) (18 - 18)  SpO2: 100% (17 Aug 2020 07:39) (100% - 100%)

## 2020-08-18 PROCEDURE — 99232 SBSQ HOSP IP/OBS MODERATE 35: CPT

## 2020-08-18 RX ADMIN — Medication 600 MILLIGRAM(S): at 12:53

## 2020-08-18 RX ADMIN — Medication 1 TABLET(S): at 09:54

## 2020-08-18 RX ADMIN — CYCLOBENZAPRINE HYDROCHLORIDE 5 MILLIGRAM(S): 10 TABLET, FILM COATED ORAL at 22:13

## 2020-08-18 RX ADMIN — Medication 600 MILLIGRAM(S): at 13:53

## 2020-08-18 RX ADMIN — Medication 1 MILLIGRAM(S): at 09:54

## 2020-08-18 RX ADMIN — DIVALPROEX SODIUM 250 MILLIGRAM(S): 500 TABLET, DELAYED RELEASE ORAL at 09:54

## 2020-08-18 RX ADMIN — Medication 1 PATCH: at 09:54

## 2020-08-18 RX ADMIN — Medication 600 MILLIGRAM(S): at 22:13

## 2020-08-18 RX ADMIN — BUPROPION HYDROCHLORIDE 150 MILLIGRAM(S): 150 TABLET, EXTENDED RELEASE ORAL at 09:54

## 2020-08-18 RX ADMIN — DIVALPROEX SODIUM 750 MILLIGRAM(S): 500 TABLET, DELAYED RELEASE ORAL at 21:08

## 2020-08-18 NOTE — PROGRESS NOTE BEHAVIORAL HEALTH - NSBHFUPDXUPDATE_PSY_A_CORE
If you are a smoker, it is important for your health to stop smoking. Please be aware that second hand smoke is also harmful.
no
no

## 2020-08-18 NOTE — PROGRESS NOTE BEHAVIORAL HEALTH - NSBHFUPINTERVALHXFT_PSY_A_CORE
Pt with no side effects currently he is less isolative and is attending groups.  Pt with aftercare planning and is considering attending rehab treatment.  Pt denies any suicidal ideation intent or plan .

## 2020-08-18 NOTE — PROGRESS NOTE BEHAVIORAL HEALTH - SUMMARY
45 year-old man with depression and cocaine and alcohol use disorders, history of ADHD, denying psychiatric hospitalizations, no known suicide attempts, undomiciled, unemployed, single, who walked in to the ED today with a chief complaint of being suicidal. The patient reports persistently low mood and anhedonia in the context of psychosocial stressors such as homelessness and unemployment and social isolation. He reports SI with a plan to overdose on drugs. He presents as dysphoric and hopeless. The patient is appropriate for inpatient treatment.    Hospitalization:  8/18/2020 Pt with no side effect currently with the dose of the depakote adjusted to 750mg   8/17/2020 Pt with level of 51 and will adjust the dose of the depakote  8/14/2020 Pt with improved sleep Pt with decreased anxiety and denies any suicidal ideation intent or plan

## 2020-08-18 NOTE — PROGRESS NOTE BEHAVIORAL HEALTH - NSBHCHARTREVIEWVS_PSY_A_CORE FT
Vital Signs Last 24 Hrs  T(C): 36.1 (18 Aug 2020 07:39), Max: 36.1 (18 Aug 2020 07:39)  T(F): 97 (18 Aug 2020 07:39), Max: 97 (18 Aug 2020 07:39)  HR: --  BP: --  BP(mean): --  RR: 18 (18 Aug 2020 07:39) (18 - 18)  SpO2: 100% (18 Aug 2020 07:39) (100% - 100%)

## 2020-08-19 RX ADMIN — CYCLOBENZAPRINE HYDROCHLORIDE 5 MILLIGRAM(S): 10 TABLET, FILM COATED ORAL at 23:28

## 2020-08-19 RX ADMIN — Medication 1 PATCH: at 09:14

## 2020-08-19 RX ADMIN — Medication 1 MILLIGRAM(S): at 09:14

## 2020-08-19 RX ADMIN — Medication 600 MILLIGRAM(S): at 23:28

## 2020-08-19 RX ADMIN — Medication 1 TABLET(S): at 09:14

## 2020-08-19 RX ADMIN — BUPROPION HYDROCHLORIDE 150 MILLIGRAM(S): 150 TABLET, EXTENDED RELEASE ORAL at 09:14

## 2020-08-19 RX ADMIN — DIVALPROEX SODIUM 250 MILLIGRAM(S): 500 TABLET, DELAYED RELEASE ORAL at 09:14

## 2020-08-19 RX ADMIN — DIVALPROEX SODIUM 750 MILLIGRAM(S): 500 TABLET, DELAYED RELEASE ORAL at 21:47

## 2020-08-20 PROCEDURE — 99232 SBSQ HOSP IP/OBS MODERATE 35: CPT

## 2020-08-20 RX ADMIN — Medication 1 PATCH: at 09:39

## 2020-08-20 RX ADMIN — Medication 600 MILLIGRAM(S): at 21:26

## 2020-08-20 RX ADMIN — DIVALPROEX SODIUM 750 MILLIGRAM(S): 500 TABLET, DELAYED RELEASE ORAL at 21:25

## 2020-08-20 RX ADMIN — Medication 600 MILLIGRAM(S): at 09:44

## 2020-08-20 RX ADMIN — Medication 1 PATCH: at 18:57

## 2020-08-20 RX ADMIN — Medication 1 TABLET(S): at 09:38

## 2020-08-20 RX ADMIN — Medication 600 MILLIGRAM(S): at 22:26

## 2020-08-20 RX ADMIN — CYCLOBENZAPRINE HYDROCHLORIDE 5 MILLIGRAM(S): 10 TABLET, FILM COATED ORAL at 21:27

## 2020-08-20 RX ADMIN — DIVALPROEX SODIUM 250 MILLIGRAM(S): 500 TABLET, DELAYED RELEASE ORAL at 09:38

## 2020-08-20 RX ADMIN — Medication 1 MILLIGRAM(S): at 09:38

## 2020-08-20 RX ADMIN — BUPROPION HYDROCHLORIDE 150 MILLIGRAM(S): 150 TABLET, EXTENDED RELEASE ORAL at 09:38

## 2020-08-20 NOTE — PROGRESS NOTE BEHAVIORAL HEALTH - RISK ASSESSMENT
low risk  Acute: denies any  suicidal ideation , decreased impulsivity, improving sleep  mitigating factors: Pt denies any suicidal intent or plan , pt hoping to attend rehab treatment   protective: supportive family, pt with help seeking behavior   Chronic: homeless. cocaine. alcohol low risk  Acute: denies any  suicidal ideation , decreased impulsivity, improved sleep  mitigating factors: Pt denies any suicidal intent or plan , pt hoping to attend rehab treatment   protective: supportive family, pt with help seeking behavior   Chronic: homeless. cocaine. alcohol

## 2020-08-20 NOTE — PROGRESS NOTE BEHAVIORAL HEALTH - SUMMARY
45 year-old man with depression and cocaine and alcohol use disorders, history of ADHD, denying psychiatric hospitalizations, no known suicide attempts, undomiciled, unemployed, single, who walked in to the ED today with a chief complaint of being suicidal. The patient reports persistently low mood and anhedonia in the context of psychosocial stressors such as homelessness and unemployment and social isolation. He reports SI with a plan to overdose on drugs. He presents as dysphoric and hopeless. The patient is appropriate for inpatient treatment.    Hospitalization:  8/18/2020 Pt with no side effect currently with the dose of the depakote adjusted to 750mg   8/17/2020 Pt with level of 51 and will adjust the dose of the depakote  8/14/2020 Pt with improved sleep Pt with decreased anxiety and denies any suicidal ideation intent or plan 45 year-old man with depression and cocaine and alcohol use disorders, history of ADHD, denying psychiatric hospitalizations, no known suicide attempts, undomiciled, unemployed, single, who walked in to the ED today with a chief complaint of being suicidal. The patient reports persistently low mood and anhedonia in the context of psychosocial stressors such as homelessness and unemployment and social isolation. He reports SI with a plan to overdose on drugs. He presents as dysphoric and hopeless. The patient is appropriate for inpatient treatment.    Hospitalization:    8/19/2020  pt is able to sleep   8/18/2020 Pt with no side effect currently with the dose of the depakote adjusted to 750mg   8/17/2020 Pt with level of 51 and will adjust the dose of the depakote  8/14/2020 Pt with improved sleep Pt with decreased anxiety and denies any suicidal ideation intent or plan

## 2020-08-20 NOTE — PROGRESS NOTE BEHAVIORAL HEALTH - NSBHFUPINTERVALHXFT_PSY_A_CORE
Pt reported that he is able to sleep.  Still concern able the rehab program and still hoping to get accepted.  Pt with mixed feeling about rehab but still wanting to go there.   Denies any side effects from medication .  Still working on coping skills.

## 2020-08-21 PROCEDURE — 99232 SBSQ HOSP IP/OBS MODERATE 35: CPT

## 2020-08-21 RX ADMIN — Medication 1 MILLIGRAM(S): at 09:17

## 2020-08-21 RX ADMIN — Medication 600 MILLIGRAM(S): at 10:25

## 2020-08-21 RX ADMIN — BUPROPION HYDROCHLORIDE 150 MILLIGRAM(S): 150 TABLET, EXTENDED RELEASE ORAL at 09:17

## 2020-08-21 RX ADMIN — Medication 600 MILLIGRAM(S): at 21:02

## 2020-08-21 RX ADMIN — CYCLOBENZAPRINE HYDROCHLORIDE 5 MILLIGRAM(S): 10 TABLET, FILM COATED ORAL at 21:02

## 2020-08-21 RX ADMIN — Medication 600 MILLIGRAM(S): at 09:20

## 2020-08-21 RX ADMIN — Medication 1 PATCH: at 09:17

## 2020-08-21 RX ADMIN — DIVALPROEX SODIUM 750 MILLIGRAM(S): 500 TABLET, DELAYED RELEASE ORAL at 21:02

## 2020-08-21 RX ADMIN — Medication 1 TABLET(S): at 09:17

## 2020-08-21 RX ADMIN — DIVALPROEX SODIUM 250 MILLIGRAM(S): 500 TABLET, DELAYED RELEASE ORAL at 09:17

## 2020-08-21 NOTE — PROGRESS NOTE BEHAVIORAL HEALTH - NSBHFUPINTERVALHXFT_PSY_A_CORE
Pt very focused on his legal problems and is hoping that the court would take in account of his attempt to go to rehab and that it would prevent a residential sentence but he is already going for sentencing. .

## 2020-08-21 NOTE — PROGRESS NOTE BEHAVIORAL HEALTH - OTHER
not assessed

## 2020-08-21 NOTE — PROGRESS NOTE BEHAVIORAL HEALTH - NSBHCHARTREVIEWVS_PSY_A_CORE FT
Vital Signs Last 24 Hrs  T(C): 36.7 (21 Aug 2020 08:09), Max: 36.7 (21 Aug 2020 08:09)  T(F): 98.1 (21 Aug 2020 08:09), Max: 98.1 (21 Aug 2020 08:09)  HR: --  BP: --  BP(mean): --  RR: 12 (21 Aug 2020 08:09) (12 - 12)  SpO2: 100% (21 Aug 2020 08:09) (100% - 100%)

## 2020-08-21 NOTE — PROGRESS NOTE BEHAVIORAL HEALTH - RISK ASSESSMENT
low risk  Acute: denies any  suicidal ideation , decreased impulsivity,   mitigating factors: Pt denies any suicidal intent or plan , pt hoping to attend rehab treatment, able to sleep ,   protective: supportive family, pt with help seeking behavior   Chronic: homeless. cocaine. alcohol

## 2020-08-21 NOTE — PROGRESS NOTE BEHAVIORAL HEALTH - SUMMARY
45 year-old man with depression and cocaine and alcohol use disorders, history of ADHD, denying psychiatric hospitalizations, no known suicide attempts, undomiciled, unemployed, single, who walked in to the ED today with a chief complaint of being suicidal. The patient reports persistently low mood and anhedonia in the context of psychosocial stressors such as homelessness and unemployment and social isolation. He reports SI with a plan to overdose on drugs. He presents as dysphoric and hopeless. The patient is appropriate for inpatient treatment.    Hospitalization:  8/21/2020 focused on the legal problems  8/20/2020 pt accepted for rehab   8/19/2020  pt is able to sleep   8/18/2020 Pt with no side effect currently with the dose of the depakote adjusted to 750mg   8/17/2020 Pt with level of 51 and will adjust the dose of the depakote  8/14/2020 Pt with improved sleep Pt with decreased anxiety and denies any suicidal ideation intent or plan

## 2020-08-22 RX ADMIN — Medication 600 MILLIGRAM(S): at 21:11

## 2020-08-22 RX ADMIN — BUPROPION HYDROCHLORIDE 150 MILLIGRAM(S): 150 TABLET, EXTENDED RELEASE ORAL at 09:44

## 2020-08-22 RX ADMIN — Medication 600 MILLIGRAM(S): at 11:05

## 2020-08-22 RX ADMIN — DIVALPROEX SODIUM 750 MILLIGRAM(S): 500 TABLET, DELAYED RELEASE ORAL at 21:09

## 2020-08-22 RX ADMIN — Medication 1 TABLET(S): at 09:44

## 2020-08-22 RX ADMIN — Medication 1 PATCH: at 09:44

## 2020-08-22 RX ADMIN — Medication 600 MILLIGRAM(S): at 12:00

## 2020-08-22 RX ADMIN — Medication 1 MILLIGRAM(S): at 09:43

## 2020-08-22 RX ADMIN — CYCLOBENZAPRINE HYDROCHLORIDE 5 MILLIGRAM(S): 10 TABLET, FILM COATED ORAL at 21:09

## 2020-08-22 RX ADMIN — DIVALPROEX SODIUM 250 MILLIGRAM(S): 500 TABLET, DELAYED RELEASE ORAL at 09:43

## 2020-08-23 RX ADMIN — Medication 1 PATCH: at 09:45

## 2020-08-23 RX ADMIN — DIVALPROEX SODIUM 750 MILLIGRAM(S): 500 TABLET, DELAYED RELEASE ORAL at 20:22

## 2020-08-23 RX ADMIN — DIVALPROEX SODIUM 250 MILLIGRAM(S): 500 TABLET, DELAYED RELEASE ORAL at 09:44

## 2020-08-23 RX ADMIN — Medication 1 MILLIGRAM(S): at 09:44

## 2020-08-23 RX ADMIN — Medication 1 TABLET(S): at 09:44

## 2020-08-23 RX ADMIN — BUPROPION HYDROCHLORIDE 150 MILLIGRAM(S): 150 TABLET, EXTENDED RELEASE ORAL at 09:46

## 2020-08-23 RX ADMIN — CYCLOBENZAPRINE HYDROCHLORIDE 5 MILLIGRAM(S): 10 TABLET, FILM COATED ORAL at 20:22

## 2020-08-23 RX ADMIN — Medication 600 MILLIGRAM(S): at 20:21

## 2020-08-24 PROCEDURE — 99232 SBSQ HOSP IP/OBS MODERATE 35: CPT

## 2020-08-24 RX ORDER — CYCLOBENZAPRINE HYDROCHLORIDE 10 MG/1
1 TABLET, FILM COATED ORAL
Qty: 30 | Refills: 0
Start: 2020-08-24 | End: 2020-09-22

## 2020-08-24 RX ORDER — DIVALPROEX SODIUM 500 MG/1
1 TABLET, DELAYED RELEASE ORAL
Qty: 60 | Refills: 0
Start: 2020-08-24 | End: 2020-09-22

## 2020-08-24 RX ORDER — IBUPROFEN 200 MG
1 TABLET ORAL
Qty: 90 | Refills: 0
Start: 2020-08-24 | End: 2020-09-22

## 2020-08-24 RX ORDER — NICOTINE POLACRILEX 2 MG
1 GUM BUCCAL
Qty: 30 | Refills: 0
Start: 2020-08-24 | End: 2020-09-22

## 2020-08-24 RX ORDER — NICOTINE POLACRILEX 2 MG
1 GUM BUCCAL
Qty: 180 | Refills: 0
Start: 2020-08-24 | End: 2020-09-22

## 2020-08-24 RX ORDER — BUPROPION HYDROCHLORIDE 150 MG/1
1 TABLET, EXTENDED RELEASE ORAL
Qty: 30 | Refills: 0
Start: 2020-08-24 | End: 2020-09-22

## 2020-08-24 RX ADMIN — Medication 1 MILLIGRAM(S): at 09:11

## 2020-08-24 RX ADMIN — Medication 600 MILLIGRAM(S): at 09:11

## 2020-08-24 RX ADMIN — CYCLOBENZAPRINE HYDROCHLORIDE 5 MILLIGRAM(S): 10 TABLET, FILM COATED ORAL at 21:37

## 2020-08-24 RX ADMIN — Medication 600 MILLIGRAM(S): at 21:37

## 2020-08-24 RX ADMIN — Medication 1 PATCH: at 09:12

## 2020-08-24 RX ADMIN — Medication 1 TABLET(S): at 09:11

## 2020-08-24 RX ADMIN — Medication 600 MILLIGRAM(S): at 22:37

## 2020-08-24 RX ADMIN — DIVALPROEX SODIUM 250 MILLIGRAM(S): 500 TABLET, DELAYED RELEASE ORAL at 09:11

## 2020-08-24 RX ADMIN — BUPROPION HYDROCHLORIDE 150 MILLIGRAM(S): 150 TABLET, EXTENDED RELEASE ORAL at 09:13

## 2020-08-24 RX ADMIN — DIVALPROEX SODIUM 750 MILLIGRAM(S): 500 TABLET, DELAYED RELEASE ORAL at 20:26

## 2020-08-24 RX ADMIN — Medication 600 MILLIGRAM(S): at 10:23

## 2020-08-24 NOTE — DISCHARGE NOTE BEHAVIORAL HEALTH - NSBHDCALCOHOLREFERFT_PSY_A_CORE
Patient will receive continuing addiction treatment at [facility].  See above for contact info and appointment.

## 2020-08-24 NOTE — DISCHARGE NOTE BEHAVIORAL HEALTH - PAST PSYCHIATRIC HISTORY
Patient with no formal prior history of psychiatric illness or treatment but has taken Risperdal in the past.

## 2020-08-24 NOTE — DISCHARGE NOTE BEHAVIORAL HEALTH - NSBHDCSUBSTHXFT_PSY_A_CORE
Patient is an active alcoholic and substance abuser. He has most recently been using approximately 6 gms of cocaine and 6-8 "tallboys" and 2-3 pints vodka daily for the past 4-6 months. He denies any history of withdrawal symptoms

## 2020-08-24 NOTE — DISCHARGE NOTE BEHAVIORAL HEALTH - NSBHDCSUICFCTRMIT_PSY_A_CORE
1. pt denies any suicidal ideation intent or plan   2. family support  3. pt with legal problems and sentencing in Sept

## 2020-08-24 NOTE — PROGRESS NOTE BEHAVIORAL HEALTH - RISK ASSESSMENT
low risk  Acute: denies any  suicidal ideation , decreased impulsivity,   mitigating factors: Pt denies any suicidal intent or plan , pt attend rehab treatment, able to sleep ,   protective: supportive family, pt with help seeking behavior   Chronic: homeless. cocaine. alcohol
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
## 2020-08-24 NOTE — DISCHARGE NOTE BEHAVIORAL HEALTH - NSBHDCSWCOMMENTSFT_PSY_A_CORE
Patient educated about the signs and symptoms of mood disorder, addiction, relapse prevention, the need and resources for continuing in the appropriate level of psychiatric and substance abuse treatment and the need to continue taking the above medications as prescribed unless directed otherwise by his outpatient provider.

## 2020-08-24 NOTE — DISCHARGE NOTE BEHAVIORAL HEALTH - CONDITIONS AT DISCHARGE
Patient is alert and oriented x4, reports improvement in symptoms since admission to [facility]. He is aware of who to call or to return to ED if symptoms worsen or if he becomes suicidal, he verbalized understanding. Pt also verbalized understanding of all medication and discharge instructions. He left in appropriate attire with all belongings, in stable condition. Patient is alert and oriented x4, reports improvement in symptoms since admission to [facility]. He is aware of who to call or to return to ED if symptoms worsen or if he becomes suicidal, he verbalized understanding. Currently denies SI/HI, AH/VH. Pt also verbalized understanding of all medication and discharge instructions. He left in appropriate attire with all belongings, in stable condition.

## 2020-08-24 NOTE — DISCHARGE NOTE BEHAVIORAL HEALTH - NSBHDCCRISISPLAN1FT_PSY_A_CORE
Talk to a trusted friend, family member or counselor and ask for help  Call the Suicide Hotline at [phone]  Call 911 or go to my nearest hospital emergency room

## 2020-08-24 NOTE — DISCHARGE NOTE BEHAVIORAL HEALTH - NSBHDCCRISISPLAN2FT_PSY_A_CORE
Call Dr. Spence at [facility] at [phone]  Speak to the counselor and/or doctor at [facility]

## 2020-08-24 NOTE — DISCHARGE NOTE BEHAVIORAL HEALTH - NSBHDCSUICSAFETYFT_PSY_A_CORE
Advised to return to hospital or go to nearest ED or call 911 or [phone] or [phone] hotlines for any severe, worsening or persistent symptoms including suicidal/homicidal ideations, intent or plans. Patient verbalized understanding of instructions.

## 2020-08-24 NOTE — DISCHARGE NOTE BEHAVIORAL HEALTH - NSBHDCTHERAPYFT_PSY_A_CORE
milieu therapy  supportive group therapy milieu therapy  supportive group therapy  psychoeducation related to his diagnoses and treatment  discharge planning

## 2020-08-24 NOTE — DISCHARGE NOTE BEHAVIORAL HEALTH - NSBHDCMEDSFT_PSY_A_CORE
MEDICATIONS  (STANDING):  buPROPion  milliGRAM(s) Oral daily  diVALproex  milliGRAM(s) Oral at bedtime  diVALproex  milliGRAM(s) Oral daily  folic acid 1 milliGRAM(s) Oral daily  multivitamin 1 Tablet(s) Oral daily  nicotine - 21 mG/24Hr(s) Patch 1 patch Transdermal daily

## 2020-08-24 NOTE — DISCHARGE NOTE BEHAVIORAL HEALTH - MEDICATION SUMMARY - MEDICATIONS TO TAKE
I will START or STAY ON the medications listed below when I get home from the hospital:    ibuprofen 600 mg oral tablet  -- 1 tab(s) by mouth 3 times a day, As needed, Moderate Pain (4 - 6)  -- Indication: For pain    divalproex sodium 500 mg oral delayed release tablet  -- 1 tab(s) by mouth 2 times a day   -- Indication: For Bipolar affective disorder, currently depressed, moderate    cyclobenzaprine 5 mg oral tablet  -- 1 tab(s) by mouth once a day (at bedtime), As needed, Muscle Spasm  -- Indication: For pain    buPROPion 150 mg/24 hours (XL) oral tablet, extended release  -- 1 tab(s) by mouth once a day  -- Indication: For depression    nicotine 21 mg/24 hr transdermal film, extended release  -- 1 patch by transdermal patch once a day   -- Indication: For nicotine replacement    nicotine 2 mg oral transmucosal lozenge  -- 1 lozenge oral transmucosal every 2 hours  -- Indication: For nicotine replacement

## 2020-08-24 NOTE — DISCHARGE NOTE BEHAVIORAL HEALTH - NSTOBACCOREFERRAL_GEN_A_CS
Yes Patient declined information/Patient declined referral for smoking cessation at time of admission and at discharge.

## 2020-08-24 NOTE — PROGRESS NOTE BEHAVIORAL HEALTH - SUMMARY
45 year-old man with depression and cocaine and alcohol use disorders, history of ADHD, denying psychiatric hospitalizations, no known suicide attempts, undomiciled, unemployed, single, who walked in to the ED today with a chief complaint of being suicidal. The patient reports persistently low mood and anhedonia in the context of psychosocial stressors such as homelessness and unemployment and social isolation. He reports SI with a plan to overdose on drugs. He presents as dysphoric and hopeless. The patient is appropriate for inpatient treatment.    Hospitalization:  8/24/2020 Pt with denial of any suicidal ideation intent or plan Pt is calm and attenmdgin groups  8/21/2020 focused on the legal problems  8/20/2020 pt accepted for rehab   8/19/2020  pt is able to sleep   8/18/2020 Pt with no side effect currently with the dose of the depakote adjusted to 750mg   8/17/2020 Pt with level of 51 and will adjust the dose of the depakote  8/14/2020 Pt with improved sleep Pt with decreased anxiety and denies any suicidal ideation intent or plan

## 2020-08-24 NOTE — DISCHARGE NOTE BEHAVIORAL HEALTH - NSBHDCPURPOSE1FT_PSY_A_CORE
Patient will be admitted to [facility] on Wed. 8/26/2020 at 10:00AM where he will receive continuing mental health and addiction treatment.
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
## 2020-08-25 VITALS — TEMPERATURE: 98 F | RESPIRATION RATE: 12 BRPM | OXYGEN SATURATION: 100 %

## 2020-08-25 PROCEDURE — 99232 SBSQ HOSP IP/OBS MODERATE 35: CPT

## 2020-08-25 RX ADMIN — Medication 1 PATCH: at 09:23

## 2020-08-25 RX ADMIN — DIVALPROEX SODIUM 750 MILLIGRAM(S): 500 TABLET, DELAYED RELEASE ORAL at 20:41

## 2020-08-25 RX ADMIN — Medication 1 TABLET(S): at 09:23

## 2020-08-25 RX ADMIN — Medication 1 MILLIGRAM(S): at 09:23

## 2020-08-25 RX ADMIN — CYCLOBENZAPRINE HYDROCHLORIDE 5 MILLIGRAM(S): 10 TABLET, FILM COATED ORAL at 20:41

## 2020-08-25 RX ADMIN — DIVALPROEX SODIUM 250 MILLIGRAM(S): 500 TABLET, DELAYED RELEASE ORAL at 09:23

## 2020-08-25 RX ADMIN — BUPROPION HYDROCHLORIDE 150 MILLIGRAM(S): 150 TABLET, EXTENDED RELEASE ORAL at 09:23

## 2020-08-25 RX ADMIN — Medication 1 PATCH: at 19:05

## 2020-08-25 RX ADMIN — Medication 600 MILLIGRAM(S): at 09:25

## 2020-08-25 RX ADMIN — Medication 600 MILLIGRAM(S): at 10:29

## 2020-08-25 NOTE — PROGRESS NOTE BEHAVIORAL HEALTH - NSBHCHARTREVIEWVS_PSY_A_CORE FT
Vital Signs Last 24 Hrs  T(C): 36.7 (21 Aug 2020 08:09), Max: 36.7 (21 Aug 2020 08:09)  T(F): 98.1 (21 Aug 2020 08:09), Max: 98.1 (21 Aug 2020 08:09)  HR: --  BP: --  BP(mean): --  RR: 12 (21 Aug 2020 08:09) (12 - 12)  SpO2: 100% (21 Aug 2020 08:09) (100% - 100%) Vital Signs Last 24 Hrs  T(C): 36.7 (25 Aug 2020 08:57), Max: 36.7 (25 Aug 2020 08:57)  T(F): 98 (25 Aug 2020 08:57), Max: 98 (25 Aug 2020 08:57)  HR: --  BP: --  BP(mean): --  RR: 12 (25 Aug 2020 08:57) (12 - 12)  SpO2: 100% (25 Aug 2020 08:57) (100% - 100%)

## 2020-08-25 NOTE — PROGRESS NOTE BEHAVIORAL HEALTH - SUMMARY
45 year-old man with depression and cocaine and alcohol use disorders, history of ADHD, denying psychiatric hospitalizations, no known suicide attempts, undomiciled, unemployed, single, who walked in to the ED today with a chief complaint of being suicidal. The patient reports persistently low mood and anhedonia in the context of psychosocial stressors such as homelessness and unemployment and social isolation. He reports SI with a plan to overdose on drugs. He presents as dysphoric and hopeless. The patient is appropriate for inpatient treatment.    Hospitalization:  8/24/2020 Pt with denial of any suicidal ideation intent or plan Pt is calm and attenmdgin groups  8/21/2020 focused on the legal problems  8/20/2020 pt accepted for rehab   8/19/2020  pt is able to sleep   8/18/2020 Pt with no side effect currently with the dose of the depakote adjusted to 750mg   8/17/2020 Pt with level of 51 and will adjust the dose of the depakote  8/14/2020 Pt with improved sleep Pt with decreased anxiety and denies any suicidal ideation intent or plan 45 year-old man with depression and cocaine and alcohol use disorders, history of ADHD, denying psychiatric hospitalizations, no known suicide attempts, undomiciled, unemployed, single, who walked in to the ED today with a chief complaint of being suicidal. The patient reports persistently low mood and anhedonia in the context of psychosocial stressors such as homelessness and unemployment and social isolation. He reports SI with a plan to overdose on drugs. He presents as dysphoric and hopeless. The patient is appropriate for inpatient treatment.    Hospitalization:  8/25/2020 Pt with awareness of the need for rehab treatment and is willing to go for the next level of substance treatment   8/24/2020 Pt with denial of any suicidal ideation intent or plan Pt is calm and attending groups  8/21/2020 focused on the legal problems  8/20/2020 pt accepted for rehab   8/19/2020  pt is able to sleep   8/18/2020 Pt with no side effect currently with the dose of the depakote adjusted to 750mg   8/17/2020 Pt with level of 51 and will adjust the dose of the depakote  8/14/2020 Pt with improved sleep Pt with decreased anxiety and denies any suicidal ideation intent or plan

## 2020-08-25 NOTE — PROGRESS NOTE BEHAVIORAL HEALTH - NSBHFUPINTERVALHXFT_PSY_A_CORE
. .Pt with some anxiety as he is aware of the need to attend aftercare treatment .  Pt still hopeful that his willingness to attend aftercare treatment will influence in a positive way for his legal issues.  Pt with denial of any side effects from his medication and has been attending groups for insight and coping skills

## 2020-08-26 PROCEDURE — 99232 SBSQ HOSP IP/OBS MODERATE 35: CPT

## 2020-08-26 RX ADMIN — Medication 1 PATCH: at 08:06

## 2020-08-26 RX ADMIN — Medication 1 TABLET(S): at 08:06

## 2020-08-26 RX ADMIN — DIVALPROEX SODIUM 250 MILLIGRAM(S): 500 TABLET, DELAYED RELEASE ORAL at 08:06

## 2020-08-26 RX ADMIN — Medication 1 MILLIGRAM(S): at 08:06

## 2020-08-26 RX ADMIN — BUPROPION HYDROCHLORIDE 150 MILLIGRAM(S): 150 TABLET, EXTENDED RELEASE ORAL at 08:06

## 2020-08-26 NOTE — PROGRESS NOTE BEHAVIORAL HEALTH - MOOD
Normal
Normal
Depressed
Normal
Depressed
Normal
Normal
Depressed
Normal
Depressed
Normal

## 2020-08-26 NOTE — PROGRESS NOTE BEHAVIORAL HEALTH - NSBHFUPINTERVALHXFT_PSY_A_CORE
Pt with good eye contact Alert Pt is with euthymic mood and affect is mood congruent.  Pt verbalized willing to go for aftercare treatment rehab .He denies any side effects from medication Pt with increased hopefulness that the aftercare tyreatment will help with his legal issues.

## 2020-08-26 NOTE — PROGRESS NOTE BEHAVIORAL HEALTH - NSBHCHARTREVIEWINVESTIGATE_PSY_A_CORE FT
Ventricular Rate 82 BPM    Atrial Rate 82 BPM    P-R Interval 140 ms    QRS Duration 82 ms    Q-T Interval 380 ms    QTC Calculation(Bezet) 443 ms    P Axis 74 degrees    R Axis 83 degrees    T Axis 64 degrees    Diagnosis Line Normal sinus rhythm with sinus arrhythmia  Normal ECG  No previous ECGs available  Confirmed by Harvinder Palmer MD [id number]) on 8/10/2020 5:54:20 PM
Ventricular Rate 82 BPM    Atrial Rate 82 BPM    P-R Interval 140 ms    QRS Duration 82 ms    Q-T Interval 380 ms    QTC Calculation(Bezet) 443 ms    P Axis 74 degrees    R Axis 83 degrees    T Axis 64 degrees    Diagnosis Line Normal sinus rhythm with sinus arrhythmia  Normal ECG  No previous ECGs available  Confirmed by Harvinder Palmer MD ([id number]) on 8/10/2020 5:54:20 PM
Ventricular Rate 82 BPM    Atrial Rate 82 BPM    P-R Interval 140 ms    QRS Duration 82 ms    Q-T Interval 380 ms    QTC Calculation(Bezet) 443 ms    P Axis 74 degrees    R Axis 83 degrees    T Axis 64 degrees    Diagnosis Line Normal sinus rhythm with sinus arrhythmia  Normal ECG  No previous ECGs available  Confirmed by Harvinder Palmer MD ([id number]) on 8/10/2020 5:54:20 PM
Ventricular Rate 82 BPM    Atrial Rate 82 BPM    P-R Interval 140 ms    QRS Duration 82 ms    Q-T Interval 380 ms    QTC Calculation(Bezet) 443 ms    P Axis 74 degrees    R Axis 83 degrees    T Axis 64 degrees    Diagnosis Line Normal sinus rhythm with sinus arrhythmia  Normal ECG  No previous ECGs available  Confirmed by Harvinder Palmer MD ([id number]) on 8/10/2020 5:54:20 PM
Ventricular Rate 82 BPM    Atrial Rate 82 BPM    P-R Interval 140 ms    QRS Duration 82 ms    Q-T Interval 380 ms    QTC Calculation(Bezet) 443 ms    P Axis 74 degrees    R Axis 83 degrees    T Axis 64 degrees    Diagnosis Line Normal sinus rhythm with sinus arrhythmia  Normal ECG  No previous ECGs available  Confirmed by Harvinder Palmer MD ([id number]) on 8/10/2020 5:54:20 PM
Ventricular Rate 82 BPM    Atrial Rate 82 BPM    P-R Interval 140 ms    QRS Duration 82 ms    Q-T Interval 380 ms    QTC Calculation(Bezet) 443 ms    P Axis 74 degrees    R Axis 83 degrees    T Axis 64 degrees    Diagnosis Line Normal sinus rhythm with sinus arrhythmia  Normal ECG  No previous ECGs available  Confirmed by Harvinder Palmer MD ([id number]) on 8/10/2020 5:54:20 PM
Ventricular Rate 82 BPM    Atrial Rate 82 BPM    P-R Interval 140 ms    QRS Duration 82 ms    Q-T Interval 380 ms    QTC Calculation(Bezet) 443 ms    P Axis 74 degrees    R Axis 83 degrees    T Axis 64 degrees    Diagnosis Line Normal sinus rhythm with sinus arrhythmia  Normal ECG  No previous ECGs available  Confirmed by Harvinder Palmer MD ([id number]) on 8/10/2020 5:54:20 PM
Ventricular Rate 82 BPM    Atrial Rate 82 BPM    P-R Interval 140 ms    QRS Duration 82 ms    Q-T Interval 380 ms    QTC Calculation(Bezet) 443 ms    P Axis 74 degrees    R Axis 83 degrees    T Axis 64 degrees    Diagnosis Line Normal sinus rhythm with sinus arrhythmia  Normal ECG  No previous ECGs available  Confirmed by Harvinder Palmer MD ([id number]) on 8/10/2020 5:54:20 PM
Ventricular Rate 82 BPM    Atrial Rate 82 BPM    P-R Interval 140 ms    QRS Duration 82 ms    Q-T Interval 380 ms    QTC Calculation(Bezet) 443 ms    P Axis 74 degrees    R Axis 83 degrees    T Axis 64 degrees    Diagnosis Line Normal sinus rhythm with sinus arrhythmia  Normal ECG  No previous ECGs available  Confirmed by Harvinder Palmer MD ([id number]) on 8/10/2020 5:54:20 PM
Ventricular Rate 82 BPM    Atrial Rate 82 BPM    P-R Interval 140 ms    QRS Duration 82 ms    Q-T Interval 380 ms    QTC Calculation(Bezet) 443 ms    P Axis 74 degrees    R Axis 83 degrees    T Axis 64 degrees    Diagnosis Line Normal sinus rhythm with sinus arrhythmia  Normal ECG  No previous ECGs available  Confirmed by Harvinder Palmer MD ([id number]) on 8/10/2020 5:54:20 PM
Ventricular Rate 82 BPM    Atrial Rate 82 BPM    P-R Interval 140 ms    QRS Duration 82 ms    Q-T Interval 380 ms    QTC Calculation(Bezet) 443 ms    P Axis 74 degrees    R Axis 83 degrees    T Axis 64 degrees    Diagnosis Line Normal sinus rhythm with sinus arrhythmia  Normal ECG  No previous ECGs available  Confirmed by Harvinder Palmer MD ([id number]) on 8/10/2020 5:54:20 PM
Ventricular Rate 82 BPM    Atrial Rate 82 BPM    P-R Interval 140 ms    QRS Duration 82 ms    Q-T Interval 380 ms    QTC Calculation(Bezet) 443 ms    P Axis 74 degrees    R Axis 83 degrees    T Axis 64 degrees    Diagnosis Line Normal sinus rhythm with sinus arrhythmia  Normal ECG  No previous ECGs available  Confirmed by Harvinder Palmer MD ([id number]) on 8/10/2020 5:54:20 PM
Ventricular Rate 82 BPM    Atrial Rate 82 BPM    P-R Interval 140 ms    QRS Duration 82 ms    Q-T Interval 380 ms    QTC Calculation(Bezet) 443 ms    P Axis 74 degrees    R Axis 83 degrees    T Axis 64 degrees    Diagnosis Line Normal sinus rhythm with sinus arrhythmia  Normal ECG  No previous ECGs available  Confirmed by Harvinder Palmer MD ([id number]) on 8/10/2020 5:54:20 PM

## 2020-08-26 NOTE — PROGRESS NOTE BEHAVIORAL HEALTH - NSBHADMITIPOBSFT_PSY_A_CORE
pt denies any suicidal intent or plan

## 2020-08-26 NOTE — PROGRESS NOTE BEHAVIORAL HEALTH - ESTIMATED INTELLIGENCE
Below Average

## 2020-08-26 NOTE — PROGRESS NOTE BEHAVIORAL HEALTH - SUMMARY
45 year-old man with depression and cocaine and alcohol use disorders, history of ADHD, denying psychiatric hospitalizations, no known suicide attempts, undomiciled, unemployed, single, who walked in to the ED today with a chief complaint of being suicidal. The patient reports persistently low mood and anhedonia in the context of psychosocial stressors such as homelessness and unemployment and social isolation. He reports SI with a plan to overdose on drugs. He presents as dysphoric and hopeless. The patient is appropriate for inpatient treatment.    Hospitalization:  8/26/2020   Pt with good eye contact Alert . Pt is calm and willing to attend aftercare treatment  8/25/2020 Pt with awareness of the need for rehab treatment and is willing to go for the next level of substance treatment   8/24/2020 Pt with denial of any suicidal ideation intent or plan Pt is calm and attending groups  8/21/2020 focused on the legal problems  8/20/2020 pt accepted for rehab   8/19/2020  pt is able to sleep   8/18/2020 Pt with no side effect currently with the dose of the depakote adjusted to 750mg   8/17/2020 Pt with level of 51 and will adjust the dose of the depakote  8/14/2020 Pt with improved sleep Pt with decreased anxiety and denies any suicidal ideation intent or plan

## 2020-08-26 NOTE — PROGRESS NOTE BEHAVIORAL HEALTH - PROBLEM SELECTOR PROBLEM 1
Bipolar affective disorder, currently depressed, moderate

## 2020-08-26 NOTE — PROGRESS NOTE BEHAVIORAL HEALTH - ESTIMATED DISCHARGE DATE
24-Aug-2020
10-Aug-2020
24-Aug-2020
10-Aug-2020
10-Aug-2020
24-Aug-2020
24-Aug-2020
10-Aug-2020

## 2020-08-26 NOTE — PROGRESS NOTE BEHAVIORAL HEALTH - PROBLEM SELECTOR PROBLEM 3
Cocaine abuse

## 2020-08-26 NOTE — PROGRESS NOTE BEHAVIORAL HEALTH - PROBLEM SELECTOR PLAN 3
encourage to attend groups for insight and coping skill

## 2020-08-26 NOTE — PROGRESS NOTE BEHAVIORAL HEALTH - RISK ASSESSMENT
no change and remains  low risk  Acute: denies any  suicidal ideation , decreased impulsivity,   mitigating factors: Pt denies any suicidal intent or plan , pt attend rehab treatment, able to sleep ,   protective: supportive family, pt with help seeking behavior   Chronic: homeless. cocaine. alcohol

## 2020-08-26 NOTE — PROGRESS NOTE BEHAVIORAL HEALTH - THOUGHT CONTENT
Unremarkable
Unremarkable
Hopelessness/Suicidality
Unremarkable
Hopelessness/Suicidality
Unremarkable
Unremarkable
Hopelessness/Suicidality
Unremarkable
Suicidality/Hopelessness
Unremarkable

## 2020-08-26 NOTE — PROGRESS NOTE BEHAVIORAL HEALTH - NSBHCHARTREVIEWLAB_PSY_A_CORE FT
15.3   7.49  )-----------( 212      ( 10 Aug 2020 07:42 )             45.5     CBC Full  -  ( 10 Aug 2020 07:42 )  WBC Count : 7.49 K/uL  RBC Count : 4.75 M/uL  Hemoglobin : 15.3 g/dL  Hematocrit : 45.5 %  Platelet Count - Automated : 212 K/uL  Mean Cell Volume : 95.8 fl  Mean Cell Hemoglobin : 32.2 pg  Mean Cell Hemoglobin Concentration : 33.6 gm/dL  Auto Neutrophil # : x  Auto Lymphocyte # : x  Auto Monocyte # : x  Auto Eosinophil # : x  Auto Basophil # : x  Auto Neutrophil % : x  Auto Lymphocyte % : x  Auto Monocyte % : x  Auto Eosinophil % : x  Auto Basophil % : x

## 2020-08-26 NOTE — PROGRESS NOTE BEHAVIORAL HEALTH - ORIENTED TO PLACE
Patient is asking for a return call to the above number.  Patients family has been DX with influenza A.  Patient is symptom free, but Dr would like to put her on TAMAFLU as a precaution and is wanting to know if Dr Umana feels this is a good idea.  
Please tell pt Tamiflu is fine. Thanks, LR  
Pt last seen by Dr Umana Aug 2015.  No dx listed of MS.  Message to Dr Umana to ask how to respond.   
Pt was called and informed.   
Yes

## 2020-08-26 NOTE — PROGRESS NOTE BEHAVIORAL HEALTH - PRIMARY DX
Bipolar affective disorder, currently depressed, moderate
Bipolar affective disorder, currently depressed, moderate
MDD (major depressive disorder)
Bipolar affective disorder, currently depressed, moderate
Bipolar affective disorder, currently depressed, moderate
MDD (major depressive disorder)
Bipolar affective disorder, currently depressed, moderate
Bipolar affective disorder, currently depressed, moderate
MDD (major depressive disorder)
Bipolar affective disorder, currently depressed, moderate

## 2020-08-26 NOTE — PROGRESS NOTE BEHAVIORAL HEALTH - SECONDARY DX1
Alcohol abuse
Alcohol abuse
Cocaine use disorder
Alcohol abuse
Alcohol abuse
Cocaine use disorder
Alcohol abuse
Alcohol abuse
Cocaine use disorder
Alcohol abuse

## 2020-08-26 NOTE — PROGRESS NOTE BEHAVIORAL HEALTH - NSBHADMITMEDEDUDETAILS_A_CORE FT
about the use of medication and of potential side effects

## 2020-08-26 NOTE — PROGRESS NOTE BEHAVIORAL HEALTH - NSBHPTASSESSDT_PSY_A_CORE
10-Aug-2020 12:03
11-Aug-2020 09:57
12-Aug-2020 09:44
13-Aug-2020 14:09
14-Aug-2020 13:40
19-Aug-2020 10:47
21-Aug-2020 11:30
24-Aug-2020 14:12
25-Aug-2020 09:13
26-Aug-2020 07:31
17-Aug-2020 11:29
20-Aug-2020 12:46
18-Aug-2020 13:02

## 2020-08-26 NOTE — PROGRESS NOTE BEHAVIORAL HEALTH - NSBHFUPTYPE_PSY_A_CORE
Inpatient
Inpatient-On Service Note
Inpatient

## 2020-08-26 NOTE — PROGRESS NOTE BEHAVIORAL HEALTH - NSBHFUPSUICINTERVAL_PSY_A_CORE
none known
yes
none known

## 2020-08-28 DIAGNOSIS — F17.210 NICOTINE DEPENDENCE, CIGARETTES, UNCOMPLICATED: ICD-10-CM

## 2020-08-28 DIAGNOSIS — R44.0 AUDITORY HALLUCINATIONS: ICD-10-CM

## 2020-08-28 DIAGNOSIS — M54.9 DORSALGIA, UNSPECIFIED: ICD-10-CM

## 2020-08-28 DIAGNOSIS — G89.29 OTHER CHRONIC PAIN: ICD-10-CM

## 2020-08-28 DIAGNOSIS — R45.851 SUICIDAL IDEATIONS: ICD-10-CM

## 2020-08-28 DIAGNOSIS — F90.9 ATTENTION-DEFICIT HYPERACTIVITY DISORDER, UNSPECIFIED TYPE: ICD-10-CM

## 2020-08-28 DIAGNOSIS — F10.14 ALCOHOL ABUSE WITH ALCOHOL-INDUCED MOOD DISORDER: ICD-10-CM

## 2020-08-28 DIAGNOSIS — Z81.8 FAMILY HISTORY OF OTHER MENTAL AND BEHAVIORAL DISORDERS: ICD-10-CM

## 2020-08-28 DIAGNOSIS — Z59.6 LOW INCOME: ICD-10-CM

## 2020-08-28 DIAGNOSIS — Z59.0 HOMELESSNESS: ICD-10-CM

## 2020-08-28 DIAGNOSIS — F14.14 COCAINE ABUSE WITH COCAINE-INDUCED MOOD DISORDER: ICD-10-CM

## 2020-08-28 DIAGNOSIS — F31.32 BIPOLAR DISORDER, CURRENT EPISODE DEPRESSED, MODERATE: ICD-10-CM

## 2020-08-28 SDOH — ECONOMIC STABILITY - INCOME SECURITY: LOW INCOME: Z59.6

## 2020-08-28 SDOH — ECONOMIC STABILITY - HOUSING INSECURITY: HOMELESSNESS: Z59.0

## 2020-09-04 NOTE — PATIENT PROFILE BEHAVIORAL HEALTH - NSBHPSTIDEA_PSY_A_CORE
passive
You can access the FollowMyHealth Patient Portal offered by [facility] by registering at the following website: [email or web address]. By joining [employer]’s FollowMyHealth portal, you will also be able to view your health information using other applications (apps) compatible with our system.

## 2021-04-13 NOTE — PROGRESS NOTE BEHAVIORAL HEALTH - PROBLEM SELECTOR PLAN 1
Ct left a vm about getting MH services  Robbin called back and asked if he would like to go here and see Sienna  Per ct he would like to keep his personal troubles separate from the clinic  Cm emailed him a list of [address] services found online from insurance, within the area for him to choose 
Depakote 250mg po am and 750mg po hs
Depakote 250mg po am and 500mg po hs
Depakote 250mg po am and 750mg po hs
Depakote 250mg po am and 500mg po hs
Depakote 250mg po am and 500mg po hs
Depakote 250mg po am and 750mg po hs
Depakote 250mg po am and 750mg po hs
Depakote 250mg po am and 500mg po hs

## 2022-05-31 PROBLEM — Z00.00 ENCOUNTER FOR PREVENTIVE HEALTH EXAMINATION: Status: ACTIVE | Noted: 2022-05-31

## 2022-11-07 PROBLEM — F31.9 BIPOLAR DISORDER, UNSPECIFIED: Chronic | Status: ACTIVE | Noted: 2020-08-08

## 2022-11-07 PROBLEM — F90.9 ATTENTION-DEFICIT HYPERACTIVITY DISORDER, UNSPECIFIED TYPE: Chronic | Status: ACTIVE | Noted: 2020-08-08

## 2022-11-10 ENCOUNTER — APPOINTMENT (OUTPATIENT)
Dept: PAIN MANAGEMENT | Facility: CLINIC | Age: 47
End: 2022-11-10

## 2022-11-10 VITALS — BODY MASS INDEX: 36.37 KG/M2 | HEIGHT: 68 IN | WEIGHT: 240 LBS

## 2022-11-10 PROCEDURE — 99204 OFFICE O/P NEW MOD 45 MIN: CPT
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
## 2022-11-10 NOTE — PHYSICAL EXAM
Department of Anesthesiology  Preprocedure Note       Name:  Rosalba Reed   Age:  78 y.o.  :  1955                                          MRN:  [id number]         Date:  2022      Surgeon: Lou Borjas):  Ion Little DPM    Procedure: Procedure(s):  TOE AMPUTATION HALLUX PULSE LAVAGE    Medications prior to admission:   Prior to Admission medications    Medication Sig Start Date End Date Taking?  Authorizing Provider   Magnesium Bisglycinate (MAG GLYCINATE PO) Take 2 tablets by mouth Daily   Yes Historical Provider, MD   ZINC PO Take 1 tablet by mouth Daily   Yes Historical Provider, MD   Glucosamine-Chondroitin (GLUCOSAMINE CHONDR COMPLEX PO) Take 2 tablets by mouth Daily   Yes Historical Provider, MD   VITAMIN D-VITAMIN K PO Take 1 tablet by mouth Daily   Yes Historical Provider, MD   CINNAMON PO Take 1 tablet by mouth Daily   Yes Historical Provider, MD   TART CHERRY PO Take 1 tablet by mouth Daily   Yes Historical Provider, MD       Current medications:    Current Facility-Administered Medications   Medication Dose Route Frequency Provider Last Rate Last Admin    vancomycin 1000 mg IVPB in 250 mL D5W addavial  1,000 mg IntraVENous Q12H Lazaro Pritchett MD        insulin lispro (HUMALOG) injection vial 0-4 Units  0-4 Units SubCUTAneous TID WC Gwen Row, APRN - NP   2 Units at 22 0831    insulin lispro (HUMALOG) injection vial 0-4 Units  0-4 Units SubCUTAneous Nightly Gwen Row, APRN - NP        glucose chewable tablet 16 g  4 tablet Oral PRN Gwen Row, APRN - NP        dextrose bolus 10% 125 mL  125 mL IntraVENous PRN Gwen Row, APRN - NP        Or    dextrose bolus 10% 250 mL  250 mL IntraVENous PRN Gwen Row, APRN - NP        glucagon (rDNA) injection 1 mg  1 mg SubCUTAneous PRN Gwen Row, APRN - NP        dextrose 10 % infusion   IntraVENous Continuous PRN Gwen Row, APRN - NP        0.9 % sodium chloride infusion   IntraVENous Continuous Gwen Row, APRN - NP 75 mL/hr at 08/19/22 0847 New Bag at 08/19/22 0847    sodium chloride flush 0.9 % injection 5-40 mL  5-40 mL IntraVENous 2 times per day Javi Ramos APRN - NP        sodium chloride flush 0.9 % injection 10 mL  10 mL IntraVENous PRN Javi Ramos APRN - NP        0.9 % sodium chloride infusion   IntraVENous PRN Javi Ramos APRN - NP        potassium chloride (KLOR-CON M) extended release tablet 40 mEq  40 mEq Oral PRN Javi ChicastingYASMINE brunsonN - NP        Or   Harrell potassium bicarb-citric acid (EFFER-K) effervescent tablet 40 mEq  40 mEq Oral PRN Javi ChicastingYASMINE brunsonN - NP        Or   Harrell potassium chloride 10 mEq/100 mL IVPB (Peripheral Line)  10 mEq IntraVENous PRN Javi Ramos APRN - NP        magnesium sulfate 1000 mg in dextrose 5% 100 mL IVPB  1,000 mg IntraVENous PRN Javi ChicastingYASMINE brunsonN - NP        enoxaparin (LOVENOX) injection 40 mg  40 mg SubCUTAneous Daily Javi Ramos APRN - NP   40 mg at 08/18/22 1859    ondansetron (ZOFRAN-ODT) disintegrating tablet 4 mg  4 mg Oral Q8H PRN Javi Ramos APRN - NP        Or    ondansetron (ZOFRAN) injection 4 mg  4 mg IntraVENous Q6H PRN Javi Ramos APRN - NP        polyethylene glycol (GLYCOLAX) packet 17 g  17 g Oral Daily PRN Javi Ramos APRN - NP        acetaminophen (TYLENOL) tablet 650 mg  650 mg Oral Q6H PRN Javi Chicastingboy APRN - NP   650 mg at 08/18/22 2239    Or    acetaminophen (TYLENOL) suppository 650 mg  650 mg Rectal Q6H PRN Javi Ramos APRN - NP        piperacillin-tazobactam (ZOSYN) 3,375 mg in dextrose 5 % 50 mL IVPB extended infusion (mini-bag)  3,375 mg IntraVENous Q8H Javi Ramos APRN - NP 12.5 mL/hr at 08/19/22 1012 3,375 mg at 08/19/22 1012    HYDROcodone-acetaminophen (NORCO) 5-325 MG per tablet 1 tablet  1 tablet Oral Q4H PRN WALTER Reyes NP        Or   Harrell HYDROcodone-acetaminophen (NORCO) 5-325 MG per tablet 2 tablet  2 tablet Oral Q4H PRN WALTER Reyes - NP        vancomycin (VANCOCIN) intermittent dosing (placeholder)   Other RX Placeholder Van Lie, APRN - NP           Allergies:  No Known Allergies    Problem List:    Patient Active Problem List   Diagnosis Code    Osteomyelitis ([facility] M86.9    Hyperglycemia R73.9    Cellulitis L03.90    New onset type 2 diabetes mellitus ([facility] E11.9       Past Medical History:  History reviewed. No pertinent past medical history. Past Surgical History:        Procedure Laterality Date    TONSILLECTOMY         Social History:    Social History     Tobacco Use    Smoking status: Never    Smokeless tobacco: Never   Substance Use Topics    Alcohol use: Not on file     Comment: rarely                                Counseling given: Not Answered      Vital Signs (Current):   Vitals:    08/18/22 1824 08/18/22 1940 08/19/22 0721 08/19/22 1156   BP:  137/77 123/76 131/75   Pulse:  (!) 108 89 90   Resp:  18 17 16   Temp:  99 °F (37.2 °C) 97.9 °F (36.6 °C) 98.6 °F (37 °C)   TempSrc:  Oral Oral Oral   SpO2:  92% 95% 92%   Weight: 202 lb 8 oz (91.9 kg)      Height:                                                  BP Readings from Last 3 Encounters:   08/19/22 131/75       NPO Status:                                                                                 BMI:   Wt Readings from Last 3 Encounters:   08/18/22 202 lb 8 oz (91.9 kg)     Body mass index is 26 kg/m².     CBC:   Lab Results   Component Value Date/Time    WBC 12.6 08/19/2022 06:47 AM    RBC 5.02 08/19/2022 06:47 AM    HGB 15.2 08/19/2022 06:47 AM    HCT 46.6 08/19/2022 06:47 AM    MCV 92.8 08/19/2022 06:47 AM    RDW 12.1 08/19/2022 06:47 AM     08/19/2022 06:47 AM       CMP:   Lab Results   Component Value Date/Time     08/19/2022 06:47 AM    K 4.0 08/19/2022 06:47 AM    CL 93 08/19/2022 06:47 AM    CO2 29 08/19/2022 06:47 AM    BUN 19 08/19/2022 06:47 AM    CREATININE 1.03 08/19/2022 06:47 AM    GFRAA >60 08/19/2022 06:47 AM    LABGLOM >60 08/19/2022 06:47 AM    GLUCOSE 268 08/19/2022 06:47 AM    CALCIUM 9.5 08/19/2022 06:47 AM POC Tests:   Recent Labs     08/19/22  1156   POCGLU 222*       Coags: No results found for: PROTIME, INR, APTT    HCG (If Applicable): No results found for: PREGTESTUR, PREGSERUM, HCG, HCGQUANT     ABGs: No results found for: PHART, PO2ART, CQR8OKY, SHW5WIP, BEART, F4TPPEPO     Type & Screen (If Applicable):  No results found for: LABABO, LABRH    Drug/Infectious Status (If Applicable):  No results found for: HIV, HEPCAB    COVID-19 Screening (If Applicable): No results found for: COVID19        Anesthesia Evaluation    Airway: Mallampati: I  TM distance: >3 FB   Neck ROM: full  Mouth opening: > = 3 FB   Dental:          Pulmonary:       (-) shortness of breath                           Cardiovascular:        (-)  angina                Neuro/Psych:               GI/Hepatic/Renal:             Endo/Other:    (+) Diabetes, . Abdominal:             Vascular:   + PVD, aortic or cerebral, .        Other Findings:           Anesthesia Plan      MAC     ASA 3                                   Niraj Lorenzana MD   8/19/2022 [de-identified] : Constitutional; Appears well, no apparent distress\par Ability to communicate: Normal \par Respiratory: non-labored breathing\par Skin: No rash noted\par Head: Normocephalic, atraumatic\par Neck: no visible thyroid enlargement\par Eyes: Extraocular movements intact\par Neurologic: Alert and oriented x3\par Psychiatric: normal mood, affect and behavior \par \par  [] : light touch intact throughout both lower extremities

## 2022-11-10 NOTE — DISCUSSION/SUMMARY
[de-identified] : After discussing various treatment options with the patient including but not limited to oral medications, physical therapy, exercise, as well as interventional spinal injections, we have decided with the following plan:\par \par - Continue home exercises, stretching, activity modification, physical therapy, and conservative care.\par - MRI report and/or images was reviewed and discussed with the patient.\par - Will order lumbar MRI to rule out HNP. Please let this note serve as a formal request for authorization to perform a MRI of their Lumbar Spine.\par - Follow-up post diagnostic imaging to review and discuss further treatment.\par - Recommend continue Cyclobenzaprine 10mg BID PRN for muscle spasms and to assist with pain relief.\par

## 2022-11-22 ENCOUNTER — APPOINTMENT (OUTPATIENT)
Dept: MRI IMAGING | Facility: CLINIC | Age: 47
End: 2022-11-22

## 2022-11-25 ENCOUNTER — APPOINTMENT (OUTPATIENT)
Dept: PAIN MANAGEMENT | Facility: CLINIC | Age: 47
End: 2022-11-25

## 2022-11-25 VITALS — BODY MASS INDEX: 36.37 KG/M2 | HEIGHT: 68 IN | WEIGHT: 240 LBS

## 2022-11-25 PROCEDURE — 99213 OFFICE O/P EST LOW 20 MIN: CPT

## 2022-11-25 NOTE — DISCUSSION/SUMMARY
[de-identified] : After discussing various treatment options with the patient including but not limited to oral medications, physical therapy, exercise modalities as well as interventional spinal injections, we have decided with the following plan:\par \par - Continue home exercises, stretching, activity modification, physical therapy, and conservative care.\par - Follow-up as needed.\par - Will provide prescription for Physical Therapy.\par - Recommend continue Cyclobenzaprine 10mg BID PRN for muscle spasms and to assist with pain relief.\par

## 2022-11-25 NOTE — PHYSICAL EXAM
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
## 2023-01-16 ENCOUNTER — APPOINTMENT (OUTPATIENT)
Dept: PAIN MANAGEMENT | Facility: CLINIC | Age: 48
End: 2023-01-16

## 2023-01-26 ENCOUNTER — APPOINTMENT (OUTPATIENT)
Dept: PAIN MANAGEMENT | Facility: CLINIC | Age: 48
End: 2023-01-26
Payer: MEDICAID

## 2023-01-26 VITALS — BODY MASS INDEX: 36.37 KG/M2 | WEIGHT: 240 LBS | HEIGHT: 68 IN

## 2023-01-26 PROCEDURE — 99214 OFFICE O/P EST MOD 30 MIN: CPT

## 2023-02-02 NOTE — DISCUSSION/SUMMARY
[de-identified] : After discussing various treatment options with the patient including but not limited to oral medications, physical therapy, exercise, as well as interventional spinal injections, we have decided with the following plan:\par \par - Continue home exercises, stretching, activity modification, physical therapy, and conservative care.\par - Will order lumbar MRI to rule out HNP. Please let this note serve as a formal request for authorization to perform a MRI of their Lumbar Spine.\par - Follow-up post diagnostic imaging to review and discuss further treatment.\par - Recommend continue Cyclobenzaprine 10mg BID PRN for muscle spasms and to assist with pain relief.\par

## 2023-02-02 NOTE — PHYSICAL EXAM
[de-identified] : Constitutional; Appears well, no apparent distress\par Ability to communicate: Normal \par Respiratory: non-labored breathing\par Skin: No rash noted\par Head: Normocephalic, atraumatic\par Neck: no visible thyroid enlargement\par Eyes: Extraocular movements intact\par Neurologic: Alert and oriented x3\par Psychiatric: normal mood, affect and behavior \par \par  [] : negative sitting straight leg raise
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
## 2023-02-09 ENCOUNTER — APPOINTMENT (OUTPATIENT)
Dept: MRI IMAGING | Facility: CLINIC | Age: 48
End: 2023-02-09

## 2023-02-23 ENCOUNTER — APPOINTMENT (OUTPATIENT)
Dept: PAIN MANAGEMENT | Facility: CLINIC | Age: 48
End: 2023-02-23
Payer: MEDICAID

## 2023-02-23 VITALS — HEIGHT: 68 IN | BODY MASS INDEX: 36.37 KG/M2 | WEIGHT: 240 LBS

## 2023-02-23 PROCEDURE — 99214 OFFICE O/P EST MOD 30 MIN: CPT

## 2023-02-23 RX ORDER — IBUPROFEN 600 MG/1
600 TABLET, FILM COATED ORAL
Qty: 60 | Refills: 0 | Status: ACTIVE | COMMUNITY
Start: 2023-02-23 | End: 1900-01-01

## 2023-02-23 RX ORDER — ACETAMINOPHEN 500 MG/1
500 TABLET ORAL TWICE DAILY
Qty: 60 | Refills: 0 | Status: ACTIVE | COMMUNITY
Start: 2023-02-23 | End: 1900-01-01

## 2023-02-23 RX ORDER — CYCLOBENZAPRINE HYDROCHLORIDE 10 MG/1
10 TABLET, FILM COATED ORAL TWICE DAILY
Qty: 60 | Refills: 0 | Status: ACTIVE | COMMUNITY
Start: 2022-12-27 | End: 1900-01-01
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
## 2023-02-23 NOTE — DISCUSSION/SUMMARY
[de-identified] : After discussing various treatment options with the patient including but not limited to oral medications, physical therapy, exercise, as well as interventional spinal injections, we have decided with the following plan:\par \par - Continue home exercises, stretching, activity modification, physical therapy, and conservative care.\par - Will order lumbar MRI to rule out HNP. Please let this note serve as a formal request for aut horization to perform a MRI of their Lumbar Spine.\par - Follow-up post diagnostic imaging to review and discuss further treatment.\par - Recommend continue Cyclobenzaprine 10mg BID PRN for muscle spasms and to assist with pain relief.\par - Recommend Ibuprofen 600mg BID PRN with food. Potential adverse effects including but not limited to bleeding, ulcers, increased risk of hypertension, heart disease, kidney disease and stroke were discussed with the patient.  Medication would allow patient to be more mobile and perform ADL's.  Will continue to monitor patient and attempt to wean as soon as possible. Will use the lowest dosage possible for the shortest possible period of time.\par - Recommend Tylenol 500mg BID PRN.\par

## 2023-02-23 NOTE — PHYSICAL EXAM
[de-identified] : Constitutional; Appears well, no apparent distress\par Ability to communicate: Normal \par Respiratory: non-labored breathing\par Skin: No rash noted\par Head: Normocephalic, atraumatic\par Neck: no visible thyroid enlargement\par Eyes: Extraocular movements intact\par Neurologic: Alert and oriented x3\par Psychiatric: normal mood, affect and behavior \par \par  [] : negative sitting straight leg raise

## 2023-03-06 ENCOUNTER — APPOINTMENT (OUTPATIENT)
Dept: MRI IMAGING | Facility: CLINIC | Age: 48
End: 2023-03-06

## 2023-03-09 ENCOUNTER — APPOINTMENT (OUTPATIENT)
Dept: PAIN MANAGEMENT | Facility: CLINIC | Age: 48
End: 2023-03-09
Payer: MEDICAID

## 2023-03-09 VITALS — BODY MASS INDEX: 36.37 KG/M2 | HEIGHT: 68 IN | WEIGHT: 240 LBS

## 2023-03-09 DIAGNOSIS — M54.17 RADICULOPATHY, LUMBOSACRAL REGION: ICD-10-CM

## 2023-03-09 DIAGNOSIS — M54.50 LOW BACK PAIN, UNSPECIFIED: ICD-10-CM

## 2023-03-09 PROCEDURE — 99213 OFFICE O/P EST LOW 20 MIN: CPT

## 2023-03-09 NOTE — DISCUSSION/SUMMARY
[de-identified] : After discussing various treatment options with the patient including but not limited to oral medications, physical therapy, exercise, as well as interventional spinal injections, we have decided with the following plan:\par \par - Continue home exercises, stretching, activity modification, physical therapy, and conservative care.\par - Will order lumbar MRI to rule out HNP. Please let this note serve as a formal request for authorization to perform a MRI of their Lumbar Spine.\par - Follow-up post diagnostic imaging to review and discuss further treatment.\par - Recommend continue Cyclobenzaprine 10mg BID PRN for muscle spasms and to assist with pain relief.\par - Recommend Ibuprofen 600mg BID PRN with food. Potential adverse effects including but not limited to bleeding, ulcers, increased risk of hypertension, heart disease, kidney disease and stroke were discussed with the patient.  Medication would allow patient to be more mobile and perform ADL's.  Will continue to monitor patient and attempt to wean as soon as possible. Will use the lowest dosage possible for the shortest possible period of time.\par - Recommend Tylenol 500mg BID PRN.\par

## 2023-03-09 NOTE — PHYSICAL EXAM
[de-identified] : Constitutional; Appears well, no apparent distress\par Ability to communicate: Normal \par Respiratory: non-labored breathing\par Skin: No rash noted\par Head: Normocephalic, atraumatic\par Neck: no visible thyroid enlargement\par Eyes: Extraocular movements intact\par Neurologic: Alert and oriented x3\par Psychiatric: normal mood, affect and behavior \par \par  [] : negative sitting straight leg raise
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
## 2023-03-13 ENCOUNTER — FORM ENCOUNTER (OUTPATIENT)
Age: 48
End: 2023-03-13

## 2023-03-14 ENCOUNTER — APPOINTMENT (OUTPATIENT)
Dept: MRI IMAGING | Facility: CLINIC | Age: 48
End: 2023-03-14
Payer: MEDICAID

## 2023-03-14 PROCEDURE — 72148 MRI LUMBAR SPINE W/O DYE: CPT

## 2023-03-23 ENCOUNTER — APPOINTMENT (OUTPATIENT)
Dept: PAIN MANAGEMENT | Facility: CLINIC | Age: 48
End: 2023-03-23

## 2023-03-30 ENCOUNTER — APPOINTMENT (OUTPATIENT)
Dept: PAIN MANAGEMENT | Facility: CLINIC | Age: 48
End: 2023-03-30

## 2024-09-30 NOTE — PROGRESS NOTE BEHAVIORAL HEALTH - NS ED [facility] MED ROS RESPIRATORY
No complaints
[Negative] : Heme/Lymph

## 2025-06-19 NOTE — ED ADULT NURSE NOTE - NS ED NOTE ABUSE SUSPICION NEGLECT YN
[facility]/Population Health Chart Review & Patient Outreach Details For Medication Adherence Project    Reason for Outreach Encounter: 3rd Party payor non-compliance report ([employer], [employer], [employer], etc)  2.  Patient Outreach Method: Reviewed Patient Chart  3.   Medication in question: crestor   LAST FILLED: 6/9/25 for 30 day supply  Hyperlipidemia Medications              rosuvastatin (CRESTOR) 20 MG tablet Take 20 mg by mouth.               4.  Reviewed and or Updates Made To: Patient Chart  5. Outreach Outcomes and/or actions taken: Patient filled medication and is on track to be adherent      
No